# Patient Record
Sex: FEMALE | Race: BLACK OR AFRICAN AMERICAN | NOT HISPANIC OR LATINO | Employment: FULL TIME | URBAN - METROPOLITAN AREA
[De-identification: names, ages, dates, MRNs, and addresses within clinical notes are randomized per-mention and may not be internally consistent; named-entity substitution may affect disease eponyms.]

---

## 2023-04-03 LAB
ALANINE AMINOTRANSFERASE (SGPT) (U/L) IN SER/PLAS: 9 U/L (ref 7–45)
ALBUMIN (G/DL) IN SER/PLAS: 3.9 G/DL (ref 3.4–5)
ALKALINE PHOSPHATASE (U/L) IN SER/PLAS: 34 U/L (ref 33–110)
ANION GAP IN SER/PLAS: 12 MMOL/L (ref 10–20)
ASPARTATE AMINOTRANSFERASE (SGOT) (U/L) IN SER/PLAS: 13 U/L (ref 9–39)
BASOPHILS (10*3/UL) IN BLOOD BY AUTOMATED COUNT: 0.03 X10E9/L (ref 0–0.1)
BASOPHILS/100 LEUKOCYTES IN BLOOD BY AUTOMATED COUNT: 0.7 % (ref 0–2)
BILIRUBIN TOTAL (MG/DL) IN SER/PLAS: 0.7 MG/DL (ref 0–1.2)
CALCIUM (MG/DL) IN SER/PLAS: 9.1 MG/DL (ref 8.6–10.6)
CARBON DIOXIDE, TOTAL (MMOL/L) IN SER/PLAS: 25 MMOL/L (ref 21–32)
CHLORIDE (MMOL/L) IN SER/PLAS: 107 MMOL/L (ref 98–107)
CREATININE (MG/DL) IN SER/PLAS: 0.81 MG/DL (ref 0.5–1.05)
EOSINOPHILS (10*3/UL) IN BLOOD BY AUTOMATED COUNT: 0.03 X10E9/L (ref 0–0.7)
EOSINOPHILS/100 LEUKOCYTES IN BLOOD BY AUTOMATED COUNT: 0.7 % (ref 0–6)
ERYTHROCYTE DISTRIBUTION WIDTH (RATIO) BY AUTOMATED COUNT: 13.3 % (ref 11.5–14.5)
ERYTHROCYTE MEAN CORPUSCULAR HEMOGLOBIN CONCENTRATION (G/DL) BY AUTOMATED: 33.7 G/DL (ref 32–36)
ERYTHROCYTE MEAN CORPUSCULAR VOLUME (FL) BY AUTOMATED COUNT: 96 FL (ref 80–100)
ERYTHROCYTES (10*6/UL) IN BLOOD BY AUTOMATED COUNT: 4.01 X10E12/L (ref 4–5.2)
FERRITIN (UG/LL) IN SER/PLAS: 152 UG/L (ref 8–150)
GFR FEMALE: >90 ML/MIN/1.73M2
GLUCOSE (MG/DL) IN SER/PLAS: 97 MG/DL (ref 74–99)
HEMATOCRIT (%) IN BLOOD BY AUTOMATED COUNT: 38.3 % (ref 36–46)
HEMOGLOBIN (G/DL) IN BLOOD: 12.9 G/DL (ref 12–16)
IMMATURE GRANULOCYTES/100 LEUKOCYTES IN BLOOD BY AUTOMATED COUNT: 0.5 % (ref 0–0.9)
LEUKOCYTES (10*3/UL) IN BLOOD BY AUTOMATED COUNT: 4.4 X10E9/L (ref 4.4–11.3)
LYMPHOCYTES (10*3/UL) IN BLOOD BY AUTOMATED COUNT: 1.87 X10E9/L (ref 1.2–4.8)
LYMPHOCYTES/100 LEUKOCYTES IN BLOOD BY AUTOMATED COUNT: 42.3 % (ref 13–44)
MONOCYTES (10*3/UL) IN BLOOD BY AUTOMATED COUNT: 0.39 X10E9/L (ref 0.1–1)
MONOCYTES/100 LEUKOCYTES IN BLOOD BY AUTOMATED COUNT: 8.8 % (ref 2–10)
NEUTROPHILS (10*3/UL) IN BLOOD BY AUTOMATED COUNT: 2.08 X10E9/L (ref 1.2–7.7)
NEUTROPHILS/100 LEUKOCYTES IN BLOOD BY AUTOMATED COUNT: 47 % (ref 40–80)
NRBC (PER 100 WBCS) BY AUTOMATED COUNT: 0 /100 WBC (ref 0–0)
PLATELETS (10*3/UL) IN BLOOD AUTOMATED COUNT: 229 X10E9/L (ref 150–450)
POTASSIUM (MMOL/L) IN SER/PLAS: 4.5 MMOL/L (ref 3.5–5.3)
PROTEIN TOTAL: 6.7 G/DL (ref 6.4–8.2)
SODIUM (MMOL/L) IN SER/PLAS: 139 MMOL/L (ref 136–145)
THYROTROPIN (MIU/L) IN SER/PLAS BY DETECTION LIMIT <= 0.05 MIU/L: 0.78 MIU/L (ref 0.44–3.98)
THYROXINE (T4) FREE (NG/DL) IN SER/PLAS: 1.08 NG/DL (ref 0.78–1.48)
UREA NITROGEN (MG/DL) IN SER/PLAS: 11 MG/DL (ref 6–23)

## 2023-04-06 LAB — ZINC,SERUM OR PLASMA: 60.4 UG/DL (ref 60–120)

## 2023-04-10 LAB
DEHYDROEPIANDROSTERONE (DHEA) (NG/ML) IN SER/PLAS: 2.4 NG/ML (ref 0.63–4.7)
TESTOSTERONE FREE (CHAN): 3.1 PG/ML (ref 0.1–6.4)
TESTOSTERONE,TOTAL,LC-MS/MS: 13 NG/DL (ref 2–45)

## 2023-06-01 LAB
ALANINE AMINOTRANSFERASE (SGPT) (U/L) IN SER/PLAS: 8 U/L (ref 7–45)
ALBUMIN (G/DL) IN SER/PLAS: 4.2 G/DL (ref 3.4–5)
ALKALINE PHOSPHATASE (U/L) IN SER/PLAS: 28 U/L (ref 33–110)
ANION GAP IN SER/PLAS: 11 MMOL/L (ref 10–20)
ASPARTATE AMINOTRANSFERASE (SGOT) (U/L) IN SER/PLAS: 11 U/L (ref 9–39)
BASOPHILS (10*3/UL) IN BLOOD BY AUTOMATED COUNT: 0.04 X10E9/L (ref 0–0.1)
BASOPHILS/100 LEUKOCYTES IN BLOOD BY AUTOMATED COUNT: 1.1 % (ref 0–2)
BILIRUBIN TOTAL (MG/DL) IN SER/PLAS: 1.2 MG/DL (ref 0–1.2)
C REACTIVE PROTEIN (MG/L) IN SER/PLAS BY HIGH SENSIT: 0.3 MG/L
CALCIDIOL (25 OH VITAMIN D3) (NG/ML) IN SER/PLAS: 37 NG/ML
CALCIUM (MG/DL) IN SER/PLAS: 9.6 MG/DL (ref 8.6–10.3)
CARBON DIOXIDE, TOTAL (MMOL/L) IN SER/PLAS: 24 MMOL/L (ref 21–32)
CHLORIDE (MMOL/L) IN SER/PLAS: 102 MMOL/L (ref 98–107)
CHOLESTEROL (MG/DL) IN SER/PLAS: 148 MG/DL (ref 0–199)
CHOLESTEROL IN HDL (MG/DL) IN SER/PLAS: 55.5 MG/DL
CHOLESTEROL/HDL RATIO: 2.7
COBALAMIN (VITAMIN B12) (PG/ML) IN SER/PLAS: 854 PG/ML (ref 211–911)
CREATININE (MG/DL) IN SER/PLAS: 0.79 MG/DL (ref 0.5–1.05)
EOSINOPHILS (10*3/UL) IN BLOOD BY AUTOMATED COUNT: 0.04 X10E9/L (ref 0–0.7)
EOSINOPHILS/100 LEUKOCYTES IN BLOOD BY AUTOMATED COUNT: 1.1 % (ref 0–6)
ERYTHROCYTE DISTRIBUTION WIDTH (RATIO) BY AUTOMATED COUNT: 13.5 % (ref 11.5–14.5)
ERYTHROCYTE MEAN CORPUSCULAR HEMOGLOBIN CONCENTRATION (G/DL) BY AUTOMATED: 33 G/DL (ref 32–36)
ERYTHROCYTE MEAN CORPUSCULAR VOLUME (FL) BY AUTOMATED COUNT: 95 FL (ref 80–100)
ERYTHROCYTES (10*6/UL) IN BLOOD BY AUTOMATED COUNT: 4.54 X10E12/L (ref 4–5.2)
ESTIMATED AVERAGE GLUCOSE FOR HBA1C: 117 MG/DL
ESTRADIOL (PG/ML) IN SER/PLAS: 423 PG/ML
FERRITIN (UG/LL) IN SER/PLAS: 156 UG/L (ref 8–150)
FOLLITROPIN (IU/L) IN SER/PLAS: 5.5 IU/L
GFR FEMALE: >90 ML/MIN/1.73M2
GLUCOSE (MG/DL) IN SER/PLAS: 82 MG/DL (ref 74–99)
HEMATOCRIT (%) IN BLOOD BY AUTOMATED COUNT: 43 % (ref 36–46)
HEMOGLOBIN (G/DL) IN BLOOD: 14.2 G/DL (ref 12–16)
HEMOGLOBIN A1C/HEMOGLOBIN TOTAL IN BLOOD: 5.7 %
IMMATURE GRANULOCYTES/100 LEUKOCYTES IN BLOOD BY AUTOMATED COUNT: 0 % (ref 0–0.9)
INSULIN, FASTING: 8 UIU/ML (ref 3–25)
IRON (UG/DL) IN SER/PLAS: 149 UG/DL (ref 35–150)
IRON BINDING CAPACITY (UG/DL) IN SER/PLAS: 336 UG/DL (ref 240–445)
IRON SATURATION (%) IN SER/PLAS: 44 % (ref 25–45)
LDL: 80 MG/DL (ref 0–99)
LEUKOCYTES (10*3/UL) IN BLOOD BY AUTOMATED COUNT: 3.6 X10E9/L (ref 4.4–11.3)
LYMPHOCYTES (10*3/UL) IN BLOOD BY AUTOMATED COUNT: 1.66 X10E9/L (ref 1.2–4.8)
LYMPHOCYTES/100 LEUKOCYTES IN BLOOD BY AUTOMATED COUNT: 46.1 % (ref 13–44)
MAGNESIUM (MG/DL) IN SER/PLAS: 2 MG/DL (ref 1.6–2.4)
MONOCYTES (10*3/UL) IN BLOOD BY AUTOMATED COUNT: 0.29 X10E9/L (ref 0.1–1)
MONOCYTES/100 LEUKOCYTES IN BLOOD BY AUTOMATED COUNT: 8.1 % (ref 2–10)
NEUTROPHILS (10*3/UL) IN BLOOD BY AUTOMATED COUNT: 1.57 X10E9/L (ref 1.2–7.7)
NEUTROPHILS/100 LEUKOCYTES IN BLOOD BY AUTOMATED COUNT: 43.6 % (ref 40–80)
PLATELETS (10*3/UL) IN BLOOD AUTOMATED COUNT: 261 X10E9/L (ref 150–450)
POTASSIUM (MMOL/L) IN SER/PLAS: 4.2 MMOL/L (ref 3.5–5.3)
PROTEIN TOTAL: 7.3 G/DL (ref 6.4–8.2)
SODIUM (MMOL/L) IN SER/PLAS: 133 MMOL/L (ref 136–145)
THYROTROPIN (MIU/L) IN SER/PLAS BY DETECTION LIMIT <= 0.05 MIU/L: 0.9 MIU/L (ref 0.44–3.98)
TRIGLYCERIDE (MG/DL) IN SER/PLAS: 63 MG/DL (ref 0–149)
URATE (MG/DL) IN SER/PLAS: 4.1 MG/DL (ref 2.3–6.7)
UREA NITROGEN (MG/DL) IN SER/PLAS: 9 MG/DL (ref 6–23)
VLDL: 13 MG/DL (ref 0–40)

## 2023-06-05 LAB — LIPOPROTEIN A: 76 MG/DL

## 2023-08-23 ENCOUNTER — LAB (OUTPATIENT)
Dept: LAB | Facility: LAB | Age: 43
End: 2023-08-23
Payer: COMMERCIAL

## 2023-08-23 LAB
ESTRADIOL (PG/ML) IN SER/PLAS: 57 PG/ML
FOLLITROPIN (IU/L) IN SER/PLAS: 4.3 IU/L

## 2023-08-30 PROBLEM — R92.2 DENSE BREAST: Status: ACTIVE | Noted: 2023-08-30

## 2023-08-30 PROBLEM — H92.09 UNILATERAL OTALGIA: Status: ACTIVE | Noted: 2023-08-30

## 2023-08-30 PROBLEM — S73.199A LABRAL TEAR OF HIP JOINT: Status: ACTIVE | Noted: 2023-08-30

## 2023-08-30 PROBLEM — R92.30 DENSE BREAST: Status: ACTIVE | Noted: 2023-08-30

## 2023-08-30 PROBLEM — L65.9 HAIR LOSS: Status: ACTIVE | Noted: 2023-08-30

## 2023-08-30 PROBLEM — R10.13 DYSPEPSIA: Status: ACTIVE | Noted: 2023-08-30

## 2023-08-30 PROBLEM — N94.9 DISORDER OF FEMALE GENITAL ORGANS: Status: ACTIVE | Noted: 2023-08-30

## 2023-08-30 PROBLEM — L70.9 ACNE: Status: ACTIVE | Noted: 2023-08-30

## 2023-08-30 PROBLEM — N91.2 AMENORRHEA: Status: ACTIVE | Noted: 2023-08-30

## 2023-08-30 PROBLEM — M25.852 FEMOROACETABULAR IMPINGEMENT OF LEFT HIP: Status: ACTIVE | Noted: 2023-08-30

## 2023-08-30 PROBLEM — R79.89 ELEVATED FERRITIN: Status: ACTIVE | Noted: 2023-08-30

## 2023-08-30 RX ORDER — PANTOPRAZOLE SODIUM 20 MG/1
1 TABLET, DELAYED RELEASE ORAL DAILY
COMMUNITY
Start: 2022-03-15 | End: 2024-03-25 | Stop reason: WASHOUT

## 2023-08-30 RX ORDER — SPIRONOLACTONE 50 MG/1
1 TABLET, FILM COATED ORAL DAILY
COMMUNITY
End: 2024-03-25 | Stop reason: WASHOUT

## 2023-08-30 RX ORDER — MINOXIDIL 2.5 MG/1
1 TABLET ORAL DAILY
COMMUNITY
Start: 2023-07-08

## 2023-08-30 RX ORDER — SPIRONOLACTONE 100 MG/1
1 TABLET, FILM COATED ORAL DAILY
COMMUNITY

## 2023-08-30 RX ORDER — NORETHINDRONE AND ETHINYL ESTRADIOL 7 DAYS X 3
1 KIT ORAL DAILY
COMMUNITY
Start: 2007-09-04 | End: 2024-03-25 | Stop reason: WASHOUT

## 2023-08-30 RX ORDER — TAZAROTENE 0.45 MG/G
LOTION TOPICAL
COMMUNITY
Start: 2023-03-06

## 2023-08-30 RX ORDER — ADAPALENE GEL USP, 0.3% 3 MG/G
GEL TOPICAL NIGHTLY
COMMUNITY
Start: 2019-10-24 | End: 2024-03-25 | Stop reason: WASHOUT

## 2023-08-30 RX ORDER — DAPSONE 50 MG/G
GEL TOPICAL EVERY MORNING
COMMUNITY
Start: 2019-10-24 | End: 2024-03-25 | Stop reason: WASHOUT

## 2023-10-02 ENCOUNTER — TELEPHONE (OUTPATIENT)
Dept: HEMATOLOGY/ONCOLOGY | Facility: CLINIC | Age: 43
End: 2023-10-02
Payer: COMMERCIAL

## 2023-10-02 NOTE — TELEPHONE ENCOUNTER
Call placed to patient.  Notified of providers response. Labs reviewed by phone.  Patient appreciative of call.  Call back instructions reviewed.  Patient verbalized understanding.

## 2023-10-06 ENCOUNTER — APPOINTMENT (OUTPATIENT)
Dept: PRIMARY CARE | Facility: CLINIC | Age: 43
End: 2023-10-06
Payer: COMMERCIAL

## 2023-10-24 ENCOUNTER — APPOINTMENT (OUTPATIENT)
Dept: PRIMARY CARE | Facility: CLINIC | Age: 43
End: 2023-10-24
Payer: COMMERCIAL

## 2023-12-12 ENCOUNTER — APPOINTMENT (OUTPATIENT)
Dept: HEMATOLOGY/ONCOLOGY | Facility: CLINIC | Age: 43
End: 2023-12-12
Payer: COMMERCIAL

## 2023-12-12 ENCOUNTER — TELEPHONE (OUTPATIENT)
Dept: HEMATOLOGY/ONCOLOGY | Facility: CLINIC | Age: 43
End: 2023-12-12
Payer: COMMERCIAL

## 2024-03-04 ENCOUNTER — OFFICE VISIT (OUTPATIENT)
Dept: PRIMARY CARE | Facility: CLINIC | Age: 44
End: 2024-03-04
Payer: COMMERCIAL

## 2024-03-04 VITALS
BODY MASS INDEX: 27.77 KG/M2 | TEMPERATURE: 97.5 F | OXYGEN SATURATION: 97 % | HEIGHT: 66 IN | SYSTOLIC BLOOD PRESSURE: 109 MMHG | HEART RATE: 74 BPM | DIASTOLIC BLOOD PRESSURE: 72 MMHG | WEIGHT: 172.8 LBS

## 2024-03-04 DIAGNOSIS — R60.0 LEG EDEMA, RIGHT: Primary | ICD-10-CM

## 2024-03-04 DIAGNOSIS — R06.02 SHORTNESS OF BREATH: ICD-10-CM

## 2024-03-04 PROCEDURE — 99212 OFFICE O/P EST SF 10 MIN: CPT | Performed by: INTERNAL MEDICINE

## 2024-03-04 PROCEDURE — 1036F TOBACCO NON-USER: CPT | Performed by: INTERNAL MEDICINE

## 2024-03-04 RX ORDER — CEFDINIR 300 MG/1
300 CAPSULE ORAL
COMMUNITY
Start: 2024-02-25 | End: 2024-03-25 | Stop reason: WASHOUT

## 2024-03-04 ASSESSMENT — PATIENT HEALTH QUESTIONNAIRE - PHQ9
SUM OF ALL RESPONSES TO PHQ9 QUESTIONS 1 AND 2: 0
1. LITTLE INTEREST OR PLEASURE IN DOING THINGS: NOT AT ALL
2. FEELING DOWN, DEPRESSED OR HOPELESS: NOT AT ALL

## 2024-03-04 NOTE — PROGRESS NOTES
"Subjective   Patient ID: Toya De La Fuente is a 43 y.o. female who presents for Pain (Right leg pain/swelling has been going on since flight also having headaches, and some chest discomfort (states not pain). Little bit of lightheadedness. All of this started after first flight on Wednesday, and became worse after 2nd flight home. ).    HPI   Started after flight last wed.painful and swollen.  Was initially better and got worse again.  Pain is in the lower leg.  Has slight discomfort in calf.  Also feels lightheaded and has a headache.  Has slight chest pressure in the middle and slightly out of breath with exertion  Travel was to [place] this time and took 4 hours.  2 weeks prior to that was in [place]  Does not have any personal history of blood clot.  Mother has history of blood clot  She gets regular phlebotomy for hemochromatosis  Review of Systems  No fever or chills or weight loss  No cough sinus symptoms  No nausea vomiting or heartburn or bowel changes  No urinary symptoms  No anxiety or depression  Objective   /72   Pulse 74   Temp 36.4 °C (97.5 °F)   Ht 1.676 m (5' 6\")   Wt 78.4 kg (172 lb 12.8 oz)   SpO2 97%   BMI 27.89 kg/m²   Walking in the hallway made her short of breath with heart rate of 107  Physical Exam  In general not in acute distress  No pallor or icterus  Neck supple no JVD or lymphadenopathy  Chest clear to auscultation  CVS: S1-S2 regular not tachycardic no murmur  Abdomen soft nontender no mass felt  Examination of right lower extremity reveals slight calf tenderness and slight swelling of lower extremity.  Has tenderness about ankle  Left side is normal  Assessment/Plan   Problem List Items Addressed This Visit             ICD-10-CM    Leg edema, right - Primary R60.0    Shortness of breath R06.02        Patient has leg edema and tenderness with symptoms of chest pressure and mild shortness of breath with exertion and lightheadedness.  She did travel to [place] and also [place] " recently.  Has hemochromatosis and family history of DVT  Her symptoms are somewhat concerning for DVT with PE.  Need urgent testing.  Will send her to ER.she is stable and will drive

## 2024-03-16 ENCOUNTER — TELEMEDICINE (OUTPATIENT)
Dept: PRIMARY CARE | Facility: CLINIC | Age: 44
End: 2024-03-16
Payer: COMMERCIAL

## 2024-03-16 DIAGNOSIS — B35.1 ONYCHOMYCOSIS OF GREAT TOE: ICD-10-CM

## 2024-03-16 DIAGNOSIS — R21 RASH AND NONSPECIFIC SKIN ERUPTION: Primary | ICD-10-CM

## 2024-03-16 PROCEDURE — 1036F TOBACCO NON-USER: CPT | Performed by: FAMILY MEDICINE

## 2024-03-16 PROCEDURE — 99213 OFFICE O/P EST LOW 20 MIN: CPT | Performed by: FAMILY MEDICINE

## 2024-03-16 ASSESSMENT — ENCOUNTER SYMPTOMS
FEVER: 0
SHORTNESS OF BREATH: 0
FATIGUE: 0

## 2024-03-17 NOTE — PROGRESS NOTES
Subjective   Patient ID: Toya De La Fuente is a 43 y.o. female who presents for No chief complaint on file..  HPI  Toya presents for concerns of issues with skin of right lower leg and toenail of right foot.  She sent pictures of right foot and toe.  She recently traveled via airplane, so when she complained of leg swelling last week, was sent to ER, worked up for possible clot, testing negative.  She is just concerned that there is still some pain to the leg, but no itch.    The right toenail fungus has been present for several months.  Review of Systems   Constitutional:  Negative for fatigue and fever.   Respiratory:  Negative for shortness of breath.    Cardiovascular:  Negative for chest pain.       Objective   There were no vitals filed for this visit.   Physical Exam  Constitutional:       General: She is not in acute distress.     Appearance: Normal appearance.   HENT:      Head: Normocephalic and atraumatic.      Right Ear: External ear normal.      Left Ear: External ear normal.      Nose: Nose normal.   Eyes:      Conjunctiva/sclera: Conjunctivae normal.   Pulmonary:      Effort: Pulmonary effort is normal. No respiratory distress.   Neurological:      Mental Status: She is alert and oriented to person, place, and time.   Psychiatric:         Mood and Affect: Mood normal.         Behavior: Behavior normal.         Thought Content: Thought content normal.         Assessment/Plan   There are no diagnoses linked to this encounter.    Problem List Items Addressed This Visit    None  Visit Diagnoses         Codes    Rash and nonspecific skin eruption    -  Primary R21    No concern of cellulitis.  Possibly erythema nodosum, but nothing emergent.  Reassurance given, can monitor, if symptoms worsen to see PCP     Onychomycosis of great toe     B35.1    Advised to follow up with PCP for treatment

## 2024-03-25 ENCOUNTER — OFFICE VISIT (OUTPATIENT)
Dept: PRIMARY CARE | Facility: CLINIC | Age: 44
End: 2024-03-25
Payer: COMMERCIAL

## 2024-03-25 VITALS
DIASTOLIC BLOOD PRESSURE: 80 MMHG | TEMPERATURE: 97 F | BODY MASS INDEX: 27.69 KG/M2 | SYSTOLIC BLOOD PRESSURE: 115 MMHG | WEIGHT: 172.3 LBS | HEART RATE: 99 BPM | OXYGEN SATURATION: 95 % | HEIGHT: 66 IN

## 2024-03-25 DIAGNOSIS — J40 BRONCHITIS: Primary | ICD-10-CM

## 2024-03-25 DIAGNOSIS — B35.1 ONYCHOMYCOSIS OF GREAT TOE: ICD-10-CM

## 2024-03-25 PROCEDURE — 99214 OFFICE O/P EST MOD 30 MIN: CPT | Performed by: INTERNAL MEDICINE

## 2024-03-25 PROCEDURE — 1036F TOBACCO NON-USER: CPT | Performed by: INTERNAL MEDICINE

## 2024-03-25 RX ORDER — ALBUTEROL SULFATE 90 UG/1
2 AEROSOL, METERED RESPIRATORY (INHALATION) EVERY 4 HOURS PRN
Qty: 8.5 G | Refills: 0 | Status: SHIPPED | OUTPATIENT
Start: 2024-03-25 | End: 2024-05-02 | Stop reason: SDUPTHER

## 2024-03-25 RX ORDER — AZITHROMYCIN 250 MG/1
TABLET, FILM COATED ORAL
Qty: 6 TABLET | Refills: 0 | Status: SHIPPED | OUTPATIENT
Start: 2024-03-25 | End: 2024-03-30

## 2024-03-25 RX ORDER — TERBINAFINE HYDROCHLORIDE 250 MG/1
250 TABLET ORAL DAILY
Qty: 90 TABLET | Refills: 0 | Status: SHIPPED | OUTPATIENT
Start: 2024-03-25 | End: 2024-06-23

## 2024-03-25 ASSESSMENT — PATIENT HEALTH QUESTIONNAIRE - PHQ9
1. LITTLE INTEREST OR PLEASURE IN DOING THINGS: NOT AT ALL
SUM OF ALL RESPONSES TO PHQ9 QUESTIONS 1 AND 2: 0
2. FEELING DOWN, DEPRESSED OR HOPELESS: NOT AT ALL

## 2024-03-25 NOTE — PROGRESS NOTES
"Subjective   Patient ID: Toya De La Fuente is a 43 y.o. female who presents for Cough (Sore throat, congestion, coughing up phlegm, and headache. Fungus on both big toes has been going on for a couple of months. ).    HPI   Patient is presenting with cold symptoms that started 7  days ago.  Associated symptoms include headache, fatigue, postnasal drip, sinus pain, sore throat, cough.  No chest pain .came back from [place] Saturday. No exposure to strep or pneumonia or mono.mucous blood stained  No associated symptoms of nausea, vomiting, diarrhea.  No skin rash   Tried over-the-counter medications without improvement.   She was in the ER after last visit to rule out DVT and PE.  Labs and ultrasound and CT chest was done  No blood clot  Edema much better  Has toenail fungus both big toes and would like medication    Review of Systems  No fever or chills  Feels tired  No nausea vomiting heartburn or bowel changes  No anxiety or depression  Objective   /80   Pulse 99   Temp 36.1 °C (97 °F)   Ht 1.676 m (5' 6\")   Wt 78.2 kg (172 lb 4.8 oz)   SpO2 95%   BMI 27.81 kg/m²     Physical Exam  In NAD  No pallor or icterus  No sinus tenderness normal tympanic membrane  Oral mucosa dry.  Mild throat erythema  Neck without lymphadenopathy or JVD  Chest is clear  CVS: S1-S2 regular not tachycardic no murmur no edema  Examination of bilateral toenails shows onychomycosis bilateral big toes.  Has slight hyperpigmented macular patches above ankle on right side  Assessment/Plan   Problem List Items Addressed This Visit             ICD-10-CM    Bronchitis - Primary J40    Relevant Medications    azithromycin (Zithromax) 250 mg tablet    albuterol (ProAir HFA) 90 mcg/actuation inhaler    Onychomycosis of great toe B35.1    Relevant Medications    terbinafine (LamISIL) 250 mg tablet     Likely bronchitis.  She has some shortness of breath.  Will prescribe inhaler and Zithromax.  Has been traveling a lot.  Walking pneumonia " possible.  Will get an x-ray if symptoms persist  Will prescribe Lamisil for onychomycosis.  Will check her labs at follow-up  Start Lamisil after finishing antibiotics

## 2024-04-26 ENCOUNTER — OFFICE VISIT (OUTPATIENT)
Dept: PODIATRY | Facility: CLINIC | Age: 44
End: 2024-04-26
Payer: COMMERCIAL

## 2024-04-26 DIAGNOSIS — M79.674 TOE PAIN, RIGHT: Primary | ICD-10-CM

## 2024-04-26 DIAGNOSIS — M79.675 TOE PAIN, LEFT: ICD-10-CM

## 2024-04-26 DIAGNOSIS — B35.1 NAIL FUNGAL INFECTION: ICD-10-CM

## 2024-04-26 PROCEDURE — 99202 OFFICE O/P NEW SF 15 MIN: CPT | Performed by: PODIATRIST

## 2024-04-29 NOTE — PROGRESS NOTES
CC:  painful thickened and elongated toenails    HPI:  Pt seen as new pt presents complaining painful thickened and elongated toenails that are difficult to manage.  Onset was gradual with worsening course until recently.  Aggravated by shoe gear and ambulation.   She will be starting oral lamisil as well.    PCP: TABITHA Marroquin CNP  Last visit: 3-25-24     PMH  Past Medical History:   Diagnosis Date    Abdominal distension (gaseous) 06/21/2022    Bloating    Abdominal distension (gaseous) 06/21/2022    Bloating    Candidiasis, unspecified 02/24/2014    Yeast infection    Encounter for general adult medical examination without abnormal findings 04/15/2013    Normal routine physical examination    Encounter for gynecological examination (general) (routine) without abnormal findings 06/16/2022    Well woman exam with routine gynecological exam    Encounter for insertion of intrauterine contraceptive device 04/14/2020    Encounter for IUD insertion    Encounter for removal of intrauterine contraceptive device 04/14/2020    Encounter for IUD removal    Encounter for screening for malignant neoplasm of cervix 02/23/2021    Cervical cancer screening    Other conditions influencing health status     Nulliparity    Other muscle spasm 11/11/2014    Trapezius muscle spasm    Other specified cough 12/17/2018    Upper airway cough syndrome    Other specified disorders of eustachian tube, bilateral 10/07/2015    Dysfunction of both eustachian tubes    Other specified health status 06/27/2016    Birth control    Personal history of diseases of the skin and subcutaneous tissue 04/15/2013    History of sebaceous cyst    Personal history of diseases of the skin and subcutaneous tissue     History of acne    Personal history of diseases of the skin and subcutaneous tissue     History of pityriasis rosea    Personal history of other diseases of the musculoskeletal system and connective tissue 11/11/2015    History of neck pain     Personal history of other diseases of the nervous system and sense organs 11/11/2014    History of tension headache    Personal history of other diseases of the nervous system and sense organs 12/21/2013    History of ear pain    Personal history of other diseases of the respiratory system 11/06/2016    History of bronchitis    Personal history of other medical treatment 02/23/2021    History of screening mammography    Personal history of other specified conditions 02/13/2021    History of dysuria    Personal history of other specified conditions 03/10/2021    History of epigastric pain     MEDS    Current Outpatient Medications:     albuterol (ProAir HFA) 90 mcg/actuation inhaler, Inhale 2 puffs every 4 hours if needed for wheezing or shortness of breath., Disp: 8.5 g, Rfl: 0    Arazlo 0.045 % lotion, , Disp: , Rfl:     minoxidil (Loniten) 2.5 mg tablet, Take 1 tablet (2.5 mg) by mouth once daily., Disp: , Rfl:     spironolactone (Aldactone) 100 mg tablet, Take 1 tablet (100 mg) by mouth once daily., Disp: , Rfl:     terbinafine (LamISIL) 250 mg tablet, Take 1 tablet (250 mg) by mouth once daily., Disp: 90 tablet, Rfl: 0  Allergies  No Known Allergies  Social History     Socioeconomic History    Marital status:      Spouse name: Not on file    Number of children: Not on file    Years of education: Not on file    Highest education level: Not on file   Occupational History    Not on file   Tobacco Use    Smoking status: Never    Smokeless tobacco: Never   Substance and Sexual Activity    Alcohol use: Yes    Drug use: Not on file    Sexual activity: Not on file   Other Topics Concern    Not on file   Social History Narrative    Not on file     Social Determinants of Health     Financial Resource Strain: Not on file   Food Insecurity: Not on file   Transportation Needs: Not on file   Physical Activity: Not on file   Stress: Not on file   Social Connections: Not on file   Intimate Partner Violence: Not on  file   Housing Stability: Not on file     Family History   Problem Relation Name Age of Onset    Breast cancer Mother      Heart attack Father      Diabetes type II Father      Other (ESRD) Father          Dialysis    Other (Stroke Syndrome) Father      Breast cancer Sister      Pancreatitis Brother          Acute    Other (Stroke Syndrome) Brother      Colon cancer Maternal Grandmother      Ovarian cancer Maternal Grandmother      Pancreatic cancer Maternal Grandmother      Breast cancer Other Maternal Aunt     Breast cancer Other Paternal Aunt      Past Surgical History:   Procedure Laterality Date    COLONOSCOPY  06/01/2021    Complete Colonoscopy    OTHER SURGICAL HISTORY  04/30/2020    Abdominal liposuction    SEPTOPLASTY  11/13/2017    Septoplasty       REVIEW OF SYSTEMS    DERM:   + as noted in HPI.       Physical examination:   On General Observation: Patient is a pleasant, cooperative, well developed 43 y.o.  adult female. The patient is alert and oriented to time, place and person. Patient has normal affect and mood.  There were no vitals taken for this visit.    Vascular:  DP and PT pulses are 2/4 b/l.  no edema noted. no varicosities b/l.  CFT  5 seconds to all digits bilateral.  Skin temperature is warm to warm from proximal to distal bilateral.      Muscular: Strength is 5/5 for all instrinsic and extrinsic muscle groups.     Neuro:  Proprioception present.   Sensation to vibration is  present. Protective sensation intact  at all pedal sites via [place] [employer] 5.07 monofilament bilateral.  Light touch present bilateral.     Derm:    Decreased hair growth b/l le  Left toenails: 1 Brittleness, crumbling upon debridement, subungual debris, elongation, mycotic appearance, tenderness, and thickness.   Right toenails: 1 Brittleness, crumbling upon debridement, subungual debris, elongation, mycotic appearance, tenderness, and thickness.       ASSESSMENT:    Tinea Unguium [B35.1]   Pain in right toe(s)  [M11.854]   Pain in left toe(s) [M79.838]       PLAN:   Consult  A comprehensive history and physical examination were preformed. The patient was educated on clinical findings, diagnosis and treatment plans. Patient understands all that has been explained and all questions were answered to apparent satisfaction.   -Reviewed etiology of nail fungus and treatment options, she will start oral lamisil, labs done by pcp, reviewed topical therapy, consent reviewed and signed for [place]pointe foot laser, reviewed risks, benefits, complications, hallux nails debrided, laser done times 2 toes no complications, will start topical formula 3, follow up 6 months, she will need repeat lft's 6 weeks into the oral lamisil.    Joel Gutierrez DPM

## 2024-05-02 ENCOUNTER — OFFICE VISIT (OUTPATIENT)
Dept: PRIMARY CARE | Facility: CLINIC | Age: 44
End: 2024-05-02
Payer: COMMERCIAL

## 2024-05-02 VITALS
BODY MASS INDEX: 27.64 KG/M2 | HEIGHT: 66 IN | HEART RATE: 90 BPM | WEIGHT: 172 LBS | TEMPERATURE: 97.8 F | SYSTOLIC BLOOD PRESSURE: 115 MMHG | OXYGEN SATURATION: 98 % | DIASTOLIC BLOOD PRESSURE: 70 MMHG

## 2024-05-02 DIAGNOSIS — J40 BRONCHITIS: ICD-10-CM

## 2024-05-02 DIAGNOSIS — R06.02 SHORTNESS OF BREATH: ICD-10-CM

## 2024-05-02 DIAGNOSIS — Z00.00 ANNUAL PHYSICAL EXAM: Primary | ICD-10-CM

## 2024-05-02 DIAGNOSIS — Z12.11 COLON CANCER SCREENING: ICD-10-CM

## 2024-05-02 PROCEDURE — 99396 PREV VISIT EST AGE 40-64: CPT | Performed by: INTERNAL MEDICINE

## 2024-05-02 RX ORDER — ALBUTEROL SULFATE 90 UG/1
2 AEROSOL, METERED RESPIRATORY (INHALATION) EVERY 4 HOURS PRN
Qty: 8.5 G | Refills: 1 | Status: SHIPPED | OUTPATIENT
Start: 2024-05-02 | End: 2025-05-02

## 2024-05-02 ASSESSMENT — COLUMBIA-SUICIDE SEVERITY RATING SCALE - C-SSRS
1. IN THE PAST MONTH, HAVE YOU WISHED YOU WERE DEAD OR WISHED YOU COULD GO TO SLEEP AND NOT WAKE UP?: NO
6. HAVE YOU EVER DONE ANYTHING, STARTED TO DO ANYTHING, OR PREPARED TO DO ANYTHING TO END YOUR LIFE?: NO
2. HAVE YOU ACTUALLY HAD ANY THOUGHTS OF KILLING YOURSELF?: NO

## 2024-05-02 NOTE — PROGRESS NOTES
Subjective   Patient ID: Toya De La Fuente is a 43 y.o. female who presents for Annual Exam (Pt is here for annual exam ).  HPI    Patient is here for annual exam  Does not have any new concerns today  sees ophthalmologist regularly.    Consumes healthy diet    does regular exercise  pregnancy historyg0  No bladder symptoms  Cervical cancer screen due this yr,nl 2021  No history of abnormal Pap  Mammogram due in summer  Colonoscopy due  Medical conditions:stable,inhaler helps with breathing.uses 3 times a day.  Vaccines:uptodate    Review of Systems  General: No significant change in weight, no fatigue, no fever, chills, or night sweats.  HEENT: No headache, dizziness, syncope. No blurred vision, double vision. No hearing loss, or ringing. No nasal discharge, sinus problems. No loose teeth, sore throat, hoarseness or neck stiffness.   Breast: No pain or discharge. No mass felt.   Respiratory: No cough while using inhaler,no mucous in throat, hemoptysis, wheezing, or shortness of breath. No snoring.  Cardiac: No chest pain, palpitations, orthopnea. No leg swelling or claudication pain.   Gastrointestinal: No indigestion, heartburn, nausea, vomiting, diarrhea, constipation, rectal bleeding or hemorrhoids.  Genitourinary: No UTI symptoms, stones, incontinence.  OBGYN: No abnormal bleeding, No pelvic pain or bloating.  Endocrine: No excess thirst or urination.  Hematopoietic: No anemia, easy bruising or bleeding. No history of blood transfusion.  Neuro: No localized weakness, numbness or tingling. No tremor, history of seizure. No history of memory problems.  Psychological: No anxiety, depression or sleep disturbance.    HISTORY  Social History     Socioeconomic History    Marital status:      Spouse name: Not on file    Number of children: Not on file    Years of education: Not on file    Highest education level: Not on file   Occupational History    Not on file   Tobacco Use    Smoking status: Never    Smokeless  tobacco: Never   Substance and Sexual Activity    Alcohol use: Yes    Drug use: Never    Sexual activity: Not on file   Other Topics Concern    Not on file   Social History Narrative    Not on file     Social Determinants of Health     Financial Resource Strain: Not on file   Food Insecurity: Not on file   Transportation Needs: Not on file   Physical Activity: Not on file   Stress: Not on file   Social Connections: Not on file   Intimate Partner Violence: Not on file   Housing Stability: Not on file     Family History   Problem Relation Name Age of Onset    Breast cancer Mother      Heart attack Father      Diabetes type II Father      Other (ESRD) Father          Dialysis    Other (Stroke Syndrome) Father      Breast cancer Sister      Pancreatitis Brother          Acute    Other (Stroke Syndrome) Brother      Colon cancer Maternal Grandmother      Ovarian cancer Maternal Grandmother      Pancreatic cancer Maternal Grandmother      Breast cancer Other Maternal Aunt     Breast cancer Other Paternal Aunt      Past Medical History:   Diagnosis Date    Abdominal distension (gaseous) 06/21/2022    Bloating    Abdominal distension (gaseous) 06/21/2022    Bloating    Candidiasis, unspecified 02/24/2014    Yeast infection    Encounter for general adult medical examination without abnormal findings 04/15/2013    Normal routine physical examination    Encounter for gynecological examination (general) (routine) without abnormal findings 06/16/2022    Well woman exam with routine gynecological exam    Encounter for insertion of intrauterine contraceptive device 04/14/2020    Encounter for IUD insertion    Encounter for removal of intrauterine contraceptive device 04/14/2020    Encounter for IUD removal    Encounter for screening for malignant neoplasm of cervix 02/23/2021    Cervical cancer screening    Other conditions influencing health status     Nulliparity    Other muscle spasm 11/11/2014    Trapezius muscle spasm     "Other specified cough 12/17/2018    Upper airway cough syndrome    Other specified disorders of eustachian tube, bilateral 10/07/2015    Dysfunction of both eustachian tubes    Other specified health status 06/27/2016    Birth control    Personal history of diseases of the skin and subcutaneous tissue 04/15/2013    History of sebaceous cyst    Personal history of diseases of the skin and subcutaneous tissue     History of acne    Personal history of diseases of the skin and subcutaneous tissue     History of pityriasis rosea    Personal history of other diseases of the musculoskeletal system and connective tissue 11/11/2015    History of neck pain    Personal history of other diseases of the nervous system and sense organs 11/11/2014    History of tension headache    Personal history of other diseases of the nervous system and sense organs 12/21/2013    History of ear pain    Personal history of other diseases of the respiratory system 11/06/2016    History of bronchitis    Personal history of other medical treatment 02/23/2021    History of screening mammography    Personal history of other specified conditions 02/13/2021    History of dysuria    Personal history of other specified conditions 03/10/2021    History of epigastric pain     Past Surgical History:   Procedure Laterality Date    COLONOSCOPY  06/01/2021    Complete Colonoscopy    OTHER SURGICAL HISTORY  04/30/2020    Abdominal liposuction    SEPTOPLASTY  11/13/2017    Septoplasty     @VACCINES  Objective   /70   Pulse 90   Temp 36.6 °C (97.8 °F)   Ht 1.676 m (5' 6\")   Wt 78 kg (172 lb)   SpO2 98%   BMI 27.76 kg/m²      Lab Results   Component Value Date    WBC 5.0 09/27/2023    HGB 13.7 09/27/2023    HCT 40.2 09/27/2023    MCV 95 09/27/2023     09/27/2023   PAP@MAMMOGRAM  Physical Exam  In general, well-appearing, not in acute distress, alert and oriented.  HEENT: No pallor or icterus  Neck: No lymphadenopathy, no stiffness.  Supple.  " .No JVD  Chest: Clear to auscultation, good air entry.  CVS: S1 and S2 regular.  No murmur, no gallop, S3 or S4.  No peripheral edema.  No carotid bruit.  Abdomen: Soft, no tenderness, no hepatosplenomegaly.  Extremities: No calf tenderness.  No peripheral edema.   Neuro: No focal deficits.  Psych: Mood and affect normal.  Good judgment and insight   Assessment/Plan   Problem List Items Addressed This Visit       Shortness of breath    Relevant Orders    Complete Pulmonary Function Test Pre/Post Bronchodialator (Spirometry Pre/Post/DLCO/Lung Volumes)    Bronchitis    Relevant Medications    albuterol (ProAir HFA) 90 mcg/actuation inhaler    Annual physical exam - Primary     Other Visit Diagnoses       Colon cancer screening        Relevant Orders    Colonoscopy Screening; Average Risk Patient        Preventive exam and counseling completed.  Had mammogram 6/23.  pap nl 2020 and 2021.has follow up with gyn.   up-to-date with vaccinations except Hep A second dose.Recommended to get it asap.   colonoscopy for screen  She also goes for executive physicals annually. Reviewed previous testing reports.     Acne improved, hair loss better.  Ferritin is in normal range now     Has strong family history of breast cancer. Genetic testing completed  Will order pft since she is needing inhaler 3 times daily     Onychomycosis  better with rx.

## 2024-07-17 ENCOUNTER — OFFICE VISIT (OUTPATIENT)
Dept: PRIMARY CARE | Facility: CLINIC | Age: 44
End: 2024-07-17
Payer: COMMERCIAL

## 2024-07-17 VITALS
WEIGHT: 171.2 LBS | OXYGEN SATURATION: 98 % | DIASTOLIC BLOOD PRESSURE: 69 MMHG | BODY MASS INDEX: 27.51 KG/M2 | SYSTOLIC BLOOD PRESSURE: 107 MMHG | HEART RATE: 69 BPM | HEIGHT: 66 IN

## 2024-07-17 DIAGNOSIS — R51.9 FACIAL PAIN: Primary | ICD-10-CM

## 2024-07-17 DIAGNOSIS — B00.1 COLD SORE: ICD-10-CM

## 2024-07-17 PROCEDURE — 99213 OFFICE O/P EST LOW 20 MIN: CPT | Performed by: INTERNAL MEDICINE

## 2024-07-17 PROCEDURE — 3008F BODY MASS INDEX DOCD: CPT | Performed by: INTERNAL MEDICINE

## 2024-07-17 RX ORDER — ACYCLOVIR 400 MG/1
400 TABLET ORAL
Qty: 25 TABLET | Refills: 0 | Status: SHIPPED | OUTPATIENT
Start: 2024-07-17 | End: 2024-07-22

## 2024-07-17 ASSESSMENT — PATIENT HEALTH QUESTIONNAIRE - PHQ9
2. FEELING DOWN, DEPRESSED OR HOPELESS: NOT AT ALL
1. LITTLE INTEREST OR PLEASURE IN DOING THINGS: NOT AT ALL
SUM OF ALL RESPONSES TO PHQ9 QUESTIONS 1 AND 2: 0

## 2024-07-17 NOTE — PROGRESS NOTES
"Subjective   Patient ID: Toya De La Fuente is a 43 y.o. female who presents for Pain (Pt has had left ear and jaw pain for about 3 weeks that has not gone away. Also has a middle headache. ).    HPI   Left ear and jaw pain for 3 weeks.woke up with pain.  Constant pain  No cold symptoms  No dental issues.now 2/10  Was 9/10.  No history of migraine  Took advil  Has a white patch lower lip.  Had a cold sore prior to a bowel symptoms  Has history of recurrent cold sore.  Gets 3 or 4 times every year  Other conditions are stable  Review of Systems  No fever or chills  No chest pain or shortness of breath or palpitation  No numbness tingling or weakness  Objective   /69   Pulse 69   Ht 1.676 m (5' 6\")   Wt 77.7 kg (171 lb 3.2 oz)   SpO2 98%   BMI 27.63 kg/m²     Physical Exam  In general not in acute distress  No pallor or icterus  No sinus tenderness  Slight tenderness left TMJ and small preauricular lymph nodes felt.  Small anterior cervical lymph nodes felt which are slightly tender on left side.  No goiter  Lower lip towards the left side has small irritated area with some exfoliation  Chest is clear  CVS: S1-S2 regular not tachycardic  Neuro no focal findings  Assessment/Plan   Problem List Items Addressed This Visit             ICD-10-CM    Facial pain - Primary R51.9    Cold sore B00.1    Relevant Medications    acyclovir (Zovirax) 400 mg tablet        Facial pain  Been due to cold sore and reactive lymphadenitis.  She is already back to her.  Apply warm compress  Do gentle massage to release the muscles  Acyclovir for cold sore  If lip lesion is persisting need to see dentist.  Follow-up as needed  "

## 2024-08-02 DIAGNOSIS — Z12.11 COLON CANCER SCREENING: ICD-10-CM

## 2024-08-04 RX ORDER — POLYETHYLENE GLYCOL 3350, SODIUM CHLORIDE, SODIUM BICARBONATE AND POTASSIUM CHLORIDE 420; 5.72; 11.2; 1.48 G/4L; G/4L; G/4L; G/4L
4000 POWDER, FOR SOLUTION ORAL ONCE
Qty: 4000 ML | Refills: 0 | Status: SHIPPED | OUTPATIENT
Start: 2024-08-04 | End: 2024-08-04

## 2024-08-09 PROBLEM — K59.00 CONSTIPATION: Status: ACTIVE | Noted: 2024-08-09

## 2024-08-09 PROBLEM — Z97.5 PRESENCE OF INTRAUTERINE CONTRACEPTIVE DEVICE: Status: ACTIVE | Noted: 2024-08-09

## 2024-08-09 PROBLEM — R30.0 DYSURIA: Status: ACTIVE | Noted: 2024-08-09

## 2024-08-12 DIAGNOSIS — Z00.00 HEALTH MAINTENANCE EXAMINATION: ICD-10-CM

## 2024-08-18 ENCOUNTER — ANESTHESIA EVENT (OUTPATIENT)
Dept: GASTROENTEROLOGY | Facility: EXTERNAL LOCATION | Age: 44
End: 2024-08-18

## 2024-08-30 ENCOUNTER — ANESTHESIA (OUTPATIENT)
Dept: GASTROENTEROLOGY | Facility: EXTERNAL LOCATION | Age: 44
End: 2024-08-30

## 2024-08-30 ENCOUNTER — APPOINTMENT (OUTPATIENT)
Dept: GASTROENTEROLOGY | Facility: EXTERNAL LOCATION | Age: 44
End: 2024-08-30
Payer: COMMERCIAL

## 2024-09-20 NOTE — PROGRESS NOTES
Executive Physical         Patient ID: Toya De La Fuente is a 43 y.o. female who presents for Executive Health examination    The following report is in reference to your examination which was held at [facility] on 09/27/24. First, let me state that it was a pleasure meeting with you and  we appreciate that you have chosen [facility] for your executive evaluation.    Past medical history significant for  Hemochromatosis diagnosed a few years ago she does have hematology oncology she is currently donating blood every 2 to 3 months will avoid phlebotomy    no known family history of hemochromatosis  Otherwise not hospitalized for anything  She has a very strong family history of breast cancer in her mother at age 53 and her sister at age 46 unfortunately her sister has had recurrence  She did complete genetic testing which showed a single variant of uncertain significance CDKN1B 18  She did complete MRI of her breast in July 2023  Other concerns were that of pancreatic cancer and ovarian cancer in maternal grandmother  Paternal grandmother had breast cancer  Acne for which she takes spironolactone which has worked well  Also uses minoxidil for hair  Has had issues with insomnia she travels very frequently  Has struggled with her weight over time  Has had difficulty getting in the appropriate amount of exercise with her frequent travel thinks she is doing better from the exercise standpoint but really has issues with her diet with travel  She did have some issues recently with chest discomfort and shortness of breath that she ended up seeing her primary care doctor  She has noted some pain in her legs on occasion more when sitting seems to travel up her legs only lasts a few minutes at a time  He has had some intermittent issues with cold sores she is to get a couple perhaps a year but has had several this year she does get a real warning that they are  coming on also has noted some canker sores inside of her mouth    At the time of your evaluation your biggest health concerns were  Weight gain  Risks of cancer         Past Medical History:   Diagnosis Date    Abdominal distension (gaseous) 06/21/2022    Bloating    Abdominal distension (gaseous) 06/21/2022    Bloating    Candidiasis, unspecified 02/24/2014    Yeast infection    Encounter for general adult medical examination without abnormal findings 04/15/2013    Normal routine physical examination    Encounter for gynecological examination (general) (routine) without abnormal findings 06/16/2022    Well woman exam with routine gynecological exam    Encounter for insertion of intrauterine contraceptive device 04/14/2020    Encounter for IUD insertion    Encounter for removal of intrauterine contraceptive device 04/14/2020    Encounter for IUD removal    Encounter for screening for malignant neoplasm of cervix 02/23/2021    Cervical cancer screening    Other conditions influencing health status     Nulliparity    Other muscle spasm 11/11/2014    Trapezius muscle spasm    Other specified cough 12/17/2018    Upper airway cough syndrome    Other specified disorders of eustachian tube, bilateral 10/07/2015    Dysfunction of both eustachian tubes    Other specified health status 06/27/2016    Birth control    Personal history of diseases of the skin and subcutaneous tissue 04/15/2013    History of sebaceous cyst    Personal history of diseases of the skin and subcutaneous tissue     History of acne    Personal history of diseases of the skin and subcutaneous tissue     History of pityriasis rosea    Personal history of other diseases of the musculoskeletal system and connective tissue 11/11/2015    History of neck pain    Personal history of other diseases of the nervous system and sense organs 11/11/2014    History of tension headache    Personal history of other diseases of the nervous system and sense organs  12/21/2013    History of ear pain    Personal history of other diseases of the respiratory system 11/06/2016    History of bronchitis    Personal history of other medical treatment 02/23/2021    History of screening mammography    Personal history of other specified conditions 02/13/2021    History of dysuria    Personal history of other specified conditions 03/10/2021    History of epigastric pain         Patient Active Problem List   Diagnosis    Acne    Disorder of female genital organs    Dyspepsia    Elevated ferritin    Femoroacetabular impingement of left hip    Hair loss    Hemorrhoids    Labral tear of hip joint    Unilateral otalgia    Amenorrhea    Dense breast    Leg edema, right    Shortness of breath    Bronchitis    Onychomycosis of great toe    Routine health maintenance    Facial pain    Cold sore    Constipation    Dysuria    Presence of intrauterine contraceptive device        Past Surgical History:   Procedure Laterality Date    COLONOSCOPY  06/01/2021    Complete Colonoscopy    OTHER SURGICAL HISTORY  04/30/2020    Abdominal liposuction    SEPTOPLASTY  11/13/2017    Septoplasty        Family History   Problem Relation Name Age of Onset    Breast cancer Mother  53    Hypertension Mother      Hyperlipidemia Mother      Heart attack Father  45    Diabetes type II Father      Other (ESRD) Father          Dialysis    Other (Stroke Syndrome) Father      Breast cancer Sister  46        reurrence at 53  doing ok    Breast cancer Sister  46    No Known Problems Sister      No Known Problems Sister      Pancreatitis Brother  24        Acute    Other (Stroke Syndrome) Brother      Colon cancer Maternal Grandmother      Ovarian cancer Maternal Grandmother      Pancreatic cancer Maternal Grandmother      Breast cancer Paternal Grandmother      Breast cancer Other Maternal Aunt     Breast cancer Other Paternal Aunt         Social History     Social History Narrative    He is originally from the [place] area  "did travel around [place] in the  for a while has been back in [place] for about 5 years currently resides in my [place]    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    She is  to her  of 26 years    They do not have children    Her diet overall she feels is pretty healthy though travel makes it difficult    Exercise 4 days     She is not a smoker    Occasional alcohol  2-3 times monthly         No Known Allergies       Current Outpatient Medications:     albuterol (ProAir HFA) 90 mcg/actuation inhaler, Inhale 2 puffs every 4 hours if needed for wheezing or shortness of breath., Disp: 8.5 g, Rfl: 1    Arazlo 0.045 % lotion, , Disp: , Rfl:     minoxidil (Loniten) 2.5 mg tablet, Take 1 tablet (2.5 mg) by mouth once daily., Disp: , Rfl:     spironolactone (Aldactone) 100 mg tablet, Take 1 tablet (100 mg) by mouth once daily., Disp: , Rfl:     triamcinolone (Kenalog) 0.1 % oral paste, Apply to oral lesions 3 times daily as directed, Disp: 5 g, Rfl: 1    valACYclovir (Valtrex) 1 gram tablet, Take 1 tablet (1,000 mg) by mouth 2 times a day for 7 days. Take 2 tabs (2000 mg) 2 times a days for 1 day., Disp: 4 tablet, Rfl: 5     Review of systems was negative other than that listed in present history    Visit Vitals  /76   Pulse 93   Ht 1.689 m (5' 6.5\")   Wt 76.7 kg (169 lb)   SpO2 100%   BMI 26.87 kg/m²   OB Status Unknown   Smoking Status Never   BSA 1.9 m²    InBody scan   Weight 178.7  BMI 27.6  Percent body fat 38.2  Visceral fat 15    Physical Exam  Physical examination  Reveals a well-developed woman  in no acute distress    appearance is younger than stated age  HEENT exam  Extraocular motion is intact  Tympanic membranes and external auditory canals are normal  Oropharynx is normal  There is no cervical lymphadenopathy appreciated  The thyroid is within normal limits    Lungs    clear to auscultation and percussion    Cardiovascular "   regular rate and rhythm  No murmurs rubs or gallops are appreciated    Breast examination   No dominant masses nipple discharge or axillary lymphadenopathy is appreciated    Abdomen   soft nontender bowel sounds are positive   there is no organomegaly noted      Periphery  Pulses are present without deficits noted  No peripheral edema is noted    Musculoskeletal  Gait is normal  Is no joint erythema or swelling noted  Range of motion is within normal limits  Strength is 5 of 5 without deficits noted    Dermatology  No concerning skin lesions are noted    Neurology  No deficits are noted  Judgment appears appropriate  Mood and affect are appropriate       No results found for this or any previous visit (from the past 24 hour(s)).     Office Visit on 09/24/2024   Component Date Value Ref Range Status    POC Color, Urine 09/24/2024 Yellow  Straw, Yellow, Light-Yellow Final    POC Appearance, Urine 09/24/2024 Clear  Clear Final    POC Glucose, Urine 09/24/2024 NEGATIVE  NEGATIVE mg/dl Final    POC Bilirubin, Urine 09/24/2024 NEGATIVE  NEGATIVE Final    POC Ketones, Urine 09/24/2024 NEGATIVE  NEGATIVE mg/dl Final    POC Specific Gravity, Urine 09/24/2024 1.010  1.005 - 1.035 Final    POC Blood, Urine 09/24/2024 NEGATIVE  NEGATIVE Final    POC PH, Urine 09/24/2024 6.0  No Reference Range Established PH Final    POC Protein, Urine 09/24/2024 NEGATIVE  NEGATIVE, 30 (1+) mg/dl Final    POC Urobilinogen, Urine 09/24/2024 0.2  0.2, 1.0 EU/DL Final    Poc Nitrite, Urine 09/24/2024 NEGATIVE  NEGATIVE Final    POC Leukocytes, Urine 09/24/2024 NEGATIVE  NEGATIVE Final    WBC 09/24/2024 3.4 (L)  4.4 - 11.3 x10*3/uL Final    nRBC 09/24/2024 0.0  0.0 - 0.0 /100 WBCs Final    RBC 09/24/2024 4.60  4.00 - 5.20 x10*6/uL Final    Hemoglobin 09/24/2024 14.6  12.0 - 16.0 g/dL Final    Hematocrit 09/24/2024 43.3  36.0 - 46.0 % Final    MCV 09/24/2024 94  80 - 100 fL Final    MCH 09/24/2024 31.7  26.0 - 34.0 pg Final    MCHC 09/24/2024 33.7   32.0 - 36.0 g/dL Final    RDW 09/24/2024 14.0  11.5 - 14.5 % Final    Platelets 09/24/2024 247  150 - 450 x10*3/uL Final    Neutrophils % 09/24/2024 41.9  40.0 - 80.0 % Final    Immature Granulocytes %, Automated 09/24/2024 0.3  0.0 - 0.9 % Final    Immature Granulocyte Count (IG) includes promyelocytes, myelocytes and metamyelocytes but does not include bands. Percent differential counts (%) should be interpreted in the context of the absolute cell counts (cells/UL).    Lymphocytes % 09/24/2024 40.5  13.0 - 44.0 % Final    Monocytes % 09/24/2024 14.6  2.0 - 10.0 % Final    Eosinophils % 09/24/2024 1.8  0.0 - 6.0 % Final    Basophils % 09/24/2024 0.9  0.0 - 2.0 % Final    Neutrophils Absolute 09/24/2024 1.41  1.20 - 7.70 x10*3/uL Final    Percent differential counts (%) should be interpreted in the context of the absolute cell counts (cells/uL).    Immature Granulocytes Absolute, Au* 09/24/2024 0.01  0.00 - 0.70 x10*3/uL Final    Lymphocytes Absolute 09/24/2024 1.36  1.20 - 4.80 x10*3/uL Final    Monocytes Absolute 09/24/2024 0.49  0.10 - 1.00 x10*3/uL Final    Eosinophils Absolute 09/24/2024 0.06  0.00 - 0.70 x10*3/uL Final    Basophils Absolute 09/24/2024 0.03  0.00 - 0.10 x10*3/uL Final    Magnesium 09/24/2024 1.90  1.60 - 2.40 mg/dL Final    Thyroid Stimulating Hormone 09/24/2024 1.28  0.44 - 3.98 mIU/L Final    Uric Acid 09/24/2024 4.4  2.3 - 6.7 mg/dL Final    Venipuncture immediately after or during the administration of Metamizole may lead to falsely low results. Testing should be performed immediately  prior to Metamizole dosing.    Vitamin B12 09/24/2024 >2,000 (H)  211 - 911 pg/mL Final    Vitamin D, 25-Hydroxy, Total 09/24/2024 29 (L)  30 - 100 ng/mL Final    Glucose 09/24/2024 91  74 - 99 mg/dL Final    Sodium 09/24/2024 134 (L)  136 - 145 mmol/L Final    Potassium 09/24/2024 4.5  3.5 - 5.3 mmol/L Final    Chloride 09/24/2024 100  98 - 107 mmol/L Final    Bicarbonate 09/24/2024 28  21 - 32 mmol/L Final     Anion Gap 09/24/2024 11  10 - 20 mmol/L Final    Urea Nitrogen 09/24/2024 11  6 - 23 mg/dL Final    Creatinine 09/24/2024 0.83  0.50 - 1.05 mg/dL Final    eGFR 09/24/2024 89  >60 mL/min/1.73m*2 Final    Calculations of estimated GFR are performed using the 2021 CKD-EPI Study Refit equation without the race variable for the IDMS-Traceable creatinine methods.  https://jasn.asnjournals.org/content/early/2021/09/22/ASN.1930953411    Calcium 09/24/2024 9.3  8.6 - 10.3 mg/dL Final    Albumin 09/24/2024 4.1  3.4 - 5.0 g/dL Final    Alkaline Phosphatase 09/24/2024 35  33 - 110 U/L Final    Total Protein 09/24/2024 7.2  6.4 - 8.2 g/dL Final    AST 09/24/2024 13  9 - 39 U/L Final    Bilirubin, Total 09/24/2024 0.7  0.0 - 1.2 mg/dL Final    ALT 09/24/2024 11  7 - 45 U/L Final    Patients treated with Sulfasalazine may generate falsely decreased results for ALT.    CRP, High Sensitivity 09/24/2024 6.0 (H)  <1.0 mg/L Final    Ferritin 09/24/2024 207 (H)  8 - 150 ng/mL Final    Hemoglobin A1C 09/24/2024 5.2  See comment % Final    Estimated Average Glucose 09/24/2024 103  Not Established mg/dL Final    Insulin, Fasting 09/24/2024 12  3 - 25 uIU/mL Final    Iron 09/24/2024 64  35 - 150 ug/dL Final    UIBC 09/24/2024 274  110 - 370 ug/dL Final    TIBC 09/24/2024 338  240 - 445 ug/dL Final    % Saturation 09/24/2024 19 (L)  25 - 45 % Final    Cholesterol 09/24/2024 186  0 - 199 mg/dL Final          Age      Desirable   Borderline High   High     0-19 Y     0 - 169       170 - 199     >/= 200    20-24 Y     0 - 189       190 - 224     >/= 225         >24 Y     0 - 199       200 - 239     >/= 240   **All ranges are based on fasting samples. Specific   therapeutic targets will vary based on patient-specific   cardiac risk.    Pediatric guidelines reference:Pediatrics 2011, 128(S5).Adult guidelines reference: NCEP ATPIII Guidelines,SARITA 2001, 258:2486-97    Venipuncture immediately after or during the administration of Metamizole may  lead to falsely low results. Testing should be performed immediately prior to Metamizole dosing.    HDL-Cholesterol 09/24/2024 62.4  mg/dL Final      Age       Very Low   Low     Normal    High    0-19 Y    < 35      < 40     40-45     ----  20-24 Y    ----     < 40      >45      ----        >24 Y      ----     < 40     40-60      >60      Cholesterol/HDL Ratio 09/24/2024 3.0   Final      Ref Values  Desirable  < 3.4  High Risk  > 5.0    LDL Calculated 09/24/2024 109 (H)  <=99 mg/dL Final                                Near   Borderline      AGE      Desirable  Optimal    High     High     Very High     0-19 Y     0 - 109     ---    110-129   >/= 130     ----    20-24 Y     0 - 119     ---    120-159   >/= 160     ----      >24 Y     0 -  99   100-129  130-159   160-189     >/=190      VLDL 09/24/2024 14  0 - 40 mg/dL Final    Triglycerides 09/24/2024 71  0 - 149 mg/dL Final       Age         Desirable   Borderline High   High     Very High   0 D-90 D    19 - 174         ----         ----        ----  91 D- 9 Y     0 -  74        75 -  99     >/= 100      ----    10-19 Y     0 -  89        90 - 129     >/= 130      ----    20-24 Y     0 - 114       115 - 149     >/= 150      ----         >24 Y     0 - 149       150 - 199    200- 499    >/= 500    Venipuncture immediately after or during the administration of Metamizole may lead to falsely low results. Testing should be performed immediately prior to Metamizole dosing.    Non HDL Cholesterol 09/24/2024 124  0 - 149 mg/dL Final          Age       Desirable   Borderline High   High     Very High     0-19 Y     0 - 119       120 - 144     >/= 145    >/= 160    20-24 Y     0 - 149       150 - 189     >/= 190      ----         >24 Y    30 mg/dL above LDL Cholesterol goal      Lipoprotein (a) 09/24/2024 92 (H)  <=29 mg/dL Final    Performed By: [facility]  [address] [place] [id number]  : Rios Wall MD, PhD  CLIA Number:  "[id number]       No results found.         Vision Screening    20/30  both near and far  Hearing screen is normal  Bone density test is normal    Stress test is normal    Vascular studies are normal showing no evidence of carotid artery stenosis and no evidence of abdominal aortic aneurysm    At the time of this note mammogram results are pending        === 06/01/23 ===    CT CARDIAC SCORING WO IV CONTRAST    - Impression -  1. Coronary artery calcium score of 0*.    *Coronary artery calcium scoring may be helpful in predicting the  risk for future coronary heart disease events.  According to the  American College of Cardiology Foundation Clinical Expert Consensus  Task Force, such testing provides important prognostic information in  patients with more than one coronary heart disease risk factor. The  coronary artery calcium score correlates with the annual risk of a  non-fatal myocardial infarction or coronary heart disease death.    Coronary artery score            Annual Risk    0-99                             0.4%  100-399                        1.3%  >400                            2.4%    These three \"breakpoints\" correspond to lower, intermediate and high  risk states for future coronary events.  Such information should be  used, along with appropriate clinical judgment, to make decisions  regarding the intensity of risk factor management strategies to treat  blood lipids and to modify other non-lipid coronary risk factors.    === 09/24/24 ===    XR CHEST 2 VIEWS    - Impression -  No acute cardiopulmonary disease.    MACRO:  none    Signed by: Ester Oneill 9/25/2024 2:39 PM  Dictation workstation:   [id number]            Assessment/Plan       Discussion/Summary  In summary     Notable lab work     Total cholesterol 186 desired range less than 200  HDL which is the good cholesterol  62.4  LDL    109 desired range less than 100  Triglycerides  71 with desired range less than 150  Vitamin D is slightly low " value 29 with desired range   Iron is within normal range at 64  Ferritin is elevated 207 with normal range 8-150  Hematocrit is normal at 43.3 normal range 36-46    LP(a) a 92 with desired range less than 29 this is an independent risk factor for heart disease    Vitamin D 29 desired range   The remainder of blood work was all acceptable        Recommendations     It was great to see you again!  I am glad you are doing so well overall  As we discussed I am concerned about the weight gain and increased weight compared to last year especially with increased percent body fat and visceral fat levels on the InBody Scan.   Cholesterol was also elevated compared to previous year.  LP(a) is elevated which is an independent risk factor for heart disease and tends to be a genetic.  The good news is your stress test is normal  You did have previous coronary artery calcium score of 0 as well    Vitamin D is slightly low and I do recommend vitamin D3 about 2000 units daily    I know it is difficult with your travel schedule but hoping you can institute some changes as recommended from nutritionist and training staff.  As we discussed my biggest concern for you is risk of cancer throughout your life time because of the strong family history of multiple cancers.  I do want you to follow-up with high risk breast clinic and I have placed that order  I am glad you have completed your colonoscopy  As we discussed at some point in the future be a very good candidate for Galleri testing which is a screening  blood test for multiple cancers.  Iron level and blood count were normal ferritin elevated so looks like it is about time to donate blood for the hemochromatosis        Health maintenance    Tdap or tetanus booster every 10 years  Shingrix or shingles vaccination at age 50.  This is a series of 2 vaccinations the second vaccination is given 2 to 6 months following the first  Pneumonia vaccination with Prevnar 20 at age  65  Colon cancer screening starting at age 45 colonoscopy or if no risks could then consider Cologuard as well    Women  Yearly mammograms starting at age 40  Pap smears every 3 to 5 years  until age 65      General Wellness Tips: Please continue with a balanced diet and a regular physical activity program for at least 150 minutes/week of moderate exercise and 30 minutes/week of resistance/weight training per week.  Try to get 6 to 8 hours of sleep per night.  Please download the calm or headspace ivonne from the [employer] to assist with stress and sleep management if necessary.      I hope you found your day to be worthwhile and informative and I look forward to our continued relationship.  In conclusion,  I wish to thank you for attending the  executive health program at [facility].  I wish you and your family a safe and healthy year.    Please email me at [email or web address] or call me at [phone] if you have any questions pertaining to this report or any other medical concerns.    In good health,    Viviane Monroy        This note was partially generated using the Dragon voice recognition system.  There may be some incorrect wording ,grammar, spelling or punctuation errors that were not corrected prior to committing the note to the medical record.

## 2024-09-24 ENCOUNTER — HOSPITAL ENCOUNTER (OUTPATIENT)
Dept: VASCULAR MEDICINE | Facility: HOSPITAL | Age: 44
Discharge: HOME | End: 2024-09-24
Payer: COMMERCIAL

## 2024-09-24 ENCOUNTER — HOSPITAL ENCOUNTER (OUTPATIENT)
Dept: RADIOLOGY | Facility: HOSPITAL | Age: 44
Discharge: HOME | End: 2024-09-24
Payer: COMMERCIAL

## 2024-09-24 ENCOUNTER — HOSPITAL ENCOUNTER (OUTPATIENT)
Dept: RADIOLOGY | Facility: CLINIC | Age: 44
Discharge: HOME | End: 2024-09-24
Payer: COMMERCIAL

## 2024-09-24 ENCOUNTER — ALLIED HEALTH (OUTPATIENT)
Dept: INTEGRATIVE MEDICINE | Facility: CLINIC | Age: 44
End: 2024-09-24

## 2024-09-24 ENCOUNTER — HOSPITAL ENCOUNTER (OUTPATIENT)
Dept: CARDIOLOGY | Facility: HOSPITAL | Age: 44
Discharge: HOME | End: 2024-09-24
Payer: COMMERCIAL

## 2024-09-24 ENCOUNTER — NUTRITION (OUTPATIENT)
Dept: PRIMARY CARE | Facility: CLINIC | Age: 44
End: 2024-09-24

## 2024-09-24 ENCOUNTER — APPOINTMENT (OUTPATIENT)
Dept: PRIMARY CARE | Facility: CLINIC | Age: 44
End: 2024-09-24

## 2024-09-24 VITALS
WEIGHT: 169 LBS | OXYGEN SATURATION: 100 % | HEART RATE: 93 BPM | DIASTOLIC BLOOD PRESSURE: 76 MMHG | BODY MASS INDEX: 26.53 KG/M2 | SYSTOLIC BLOOD PRESSURE: 118 MMHG | HEIGHT: 67 IN

## 2024-09-24 DIAGNOSIS — Z00.00 HEALTH MAINTENANCE EXAMINATION: ICD-10-CM

## 2024-09-24 DIAGNOSIS — Z91.89 AT HIGH RISK FOR BREAST CANCER: ICD-10-CM

## 2024-09-24 DIAGNOSIS — Z13.6 ENCOUNTER FOR SCREENING FOR CARDIOVASCULAR DISORDERS: ICD-10-CM

## 2024-09-24 DIAGNOSIS — Z00.00 HEALTH MAINTENANCE EXAMINATION: Primary | ICD-10-CM

## 2024-09-24 DIAGNOSIS — Z86.19 HX OF COLD SORES: Primary | ICD-10-CM

## 2024-09-24 DIAGNOSIS — Z00.00 ROUTINE HEALTH MAINTENANCE: Primary | ICD-10-CM

## 2024-09-24 DIAGNOSIS — K12.0 CANKER SORE: ICD-10-CM

## 2024-09-24 LAB
25(OH)D3 SERPL-MCNC: 29 NG/ML (ref 30–100)
ALBUMIN SERPL BCP-MCNC: 4.1 G/DL (ref 3.4–5)
ALP SERPL-CCNC: 35 U/L (ref 33–110)
ALT SERPL W P-5'-P-CCNC: 11 U/L (ref 7–45)
ANION GAP SERPL CALC-SCNC: 11 MMOL/L (ref 10–20)
AST SERPL W P-5'-P-CCNC: 13 U/L (ref 9–39)
BASOPHILS # BLD AUTO: 0.03 X10*3/UL (ref 0–0.1)
BASOPHILS NFR BLD AUTO: 0.9 %
BILIRUB SERPL-MCNC: 0.7 MG/DL (ref 0–1.2)
BUN SERPL-MCNC: 11 MG/DL (ref 6–23)
CALCIUM SERPL-MCNC: 9.3 MG/DL (ref 8.6–10.3)
CHLORIDE SERPL-SCNC: 100 MMOL/L (ref 98–107)
CHOLEST SERPL-MCNC: 186 MG/DL (ref 0–199)
CHOLESTEROL/HDL RATIO: 3
CO2 SERPL-SCNC: 28 MMOL/L (ref 21–32)
CREAT SERPL-MCNC: 0.83 MG/DL (ref 0.5–1.05)
CRP SERPL HS-MCNC: 6 MG/L
EGFRCR SERPLBLD CKD-EPI 2021: 89 ML/MIN/1.73M*2
EOSINOPHIL # BLD AUTO: 0.06 X10*3/UL (ref 0–0.7)
EOSINOPHIL NFR BLD AUTO: 1.8 %
ERYTHROCYTE [DISTWIDTH] IN BLOOD BY AUTOMATED COUNT: 14 % (ref 11.5–14.5)
EST. AVERAGE GLUCOSE BLD GHB EST-MCNC: 103 MG/DL
FERRITIN SERPL-MCNC: 207 NG/ML (ref 8–150)
GLUCOSE SERPL-MCNC: 91 MG/DL (ref 74–99)
HBA1C MFR BLD: 5.2 %
HCT VFR BLD AUTO: 43.3 % (ref 36–46)
HDLC SERPL-MCNC: 62.4 MG/DL
HGB BLD-MCNC: 14.6 G/DL (ref 12–16)
IMM GRANULOCYTES # BLD AUTO: 0.01 X10*3/UL (ref 0–0.7)
IMM GRANULOCYTES NFR BLD AUTO: 0.3 % (ref 0–0.9)
INSULIN P FAST SERPL-ACNC: 12 UIU/ML (ref 3–25)
IRON SATN MFR SERPL: 19 % (ref 25–45)
IRON SERPL-MCNC: 64 UG/DL (ref 35–150)
LDLC SERPL CALC-MCNC: 109 MG/DL
LYMPHOCYTES # BLD AUTO: 1.36 X10*3/UL (ref 1.2–4.8)
LYMPHOCYTES NFR BLD AUTO: 40.5 %
MAGNESIUM SERPL-MCNC: 1.9 MG/DL (ref 1.6–2.4)
MCH RBC QN AUTO: 31.7 PG (ref 26–34)
MCHC RBC AUTO-ENTMCNC: 33.7 G/DL (ref 32–36)
MCV RBC AUTO: 94 FL (ref 80–100)
MONOCYTES # BLD AUTO: 0.49 X10*3/UL (ref 0.1–1)
MONOCYTES NFR BLD AUTO: 14.6 %
NEUTROPHILS # BLD AUTO: 1.41 X10*3/UL (ref 1.2–7.7)
NEUTROPHILS NFR BLD AUTO: 41.9 %
NON HDL CHOLESTEROL: 124 MG/DL (ref 0–149)
NRBC BLD-RTO: 0 /100 WBCS (ref 0–0)
PLATELET # BLD AUTO: 247 X10*3/UL (ref 150–450)
POC APPEARANCE, URINE: CLEAR
POC BILIRUBIN, URINE: NEGATIVE
POC BLOOD, URINE: NEGATIVE
POC COLOR, URINE: YELLOW
POC GLUCOSE, URINE: NEGATIVE MG/DL
POC KETONES, URINE: NEGATIVE MG/DL
POC LEUKOCYTES, URINE: NEGATIVE
POC NITRITE,URINE: NEGATIVE
POC PH, URINE: 6 PH
POC PROTEIN, URINE: NEGATIVE MG/DL
POC SPECIFIC GRAVITY, URINE: 1.01
POC UROBILINOGEN, URINE: 0.2 EU/DL
POTASSIUM SERPL-SCNC: 4.5 MMOL/L (ref 3.5–5.3)
PROT SERPL-MCNC: 7.2 G/DL (ref 6.4–8.2)
RBC # BLD AUTO: 4.6 X10*6/UL (ref 4–5.2)
SODIUM SERPL-SCNC: 134 MMOL/L (ref 136–145)
TIBC SERPL-MCNC: 338 UG/DL (ref 240–445)
TRIGL SERPL-MCNC: 71 MG/DL (ref 0–149)
TSH SERPL-ACNC: 1.28 MIU/L (ref 0.44–3.98)
UIBC SERPL-MCNC: 274 UG/DL (ref 110–370)
URATE SERPL-MCNC: 4.4 MG/DL (ref 2.3–6.7)
VIT B12 SERPL-MCNC: >2000 PG/ML (ref 211–911)
VLDL: 14 MG/DL (ref 0–40)
WBC # BLD AUTO: 3.4 X10*3/UL (ref 4.4–11.3)

## 2024-09-24 PROCEDURE — 93017 CV STRESS TEST TRACING ONLY: CPT

## 2024-09-24 PROCEDURE — 93978 VASCULAR STUDY: CPT | Performed by: INTERNAL MEDICINE

## 2024-09-24 PROCEDURE — 93880 EXTRACRANIAL BILAT STUDY: CPT | Mod: REDUCED SERVICES | Performed by: INTERNAL MEDICINE

## 2024-09-24 PROCEDURE — 93018 CV STRESS TEST I&R ONLY: CPT | Performed by: INTERNAL MEDICINE

## 2024-09-24 PROCEDURE — 71046 X-RAY EXAM CHEST 2 VIEWS: CPT

## 2024-09-24 PROCEDURE — SMAR2 SMART-UHCSM: Performed by: INTERNAL MEDICINE

## 2024-09-24 PROCEDURE — 77067 SCR MAMMO BI INCL CAD: CPT

## 2024-09-24 PROCEDURE — 93880 EXTRACRANIAL BILAT STUDY: CPT | Mod: 52

## 2024-09-24 PROCEDURE — 93978 VASCULAR STUDY: CPT

## 2024-09-24 PROCEDURE — 93016 CV STRESS TEST SUPVJ ONLY: CPT | Performed by: INTERNAL MEDICINE

## 2024-09-24 RX ORDER — VALACYCLOVIR HYDROCHLORIDE 1 G/1
1000 TABLET, FILM COATED ORAL 2 TIMES DAILY
Qty: 4 TABLET | Refills: 5 | Status: SHIPPED | OUTPATIENT
Start: 2024-09-24 | End: 2024-10-01

## 2024-09-24 RX ORDER — TRIAMCINOLONE ACETONIDE 1 MG/G
PASTE DENTAL
Qty: 5 G | Refills: 1 | Status: SHIPPED | OUTPATIENT
Start: 2024-09-24

## 2024-09-24 NOTE — PROGRESS NOTES
Reason for Nutrition Visit:  It was a pleasure speaking with Ms. De La Fuente again to discuss diet and nutrition as part of her [facility] follow-up.    Medication Documentation Review Audit       Reviewed by Leatha Alan CMA (Medical Assistant) on 09/24/24 at 1012      Medication Order Taking? Sig Documenting Provider Last Dose Status   albuterol (ProAir HFA) 90 mcg/actuation inhaler [id number]  Inhale 2 puffs every 4 hours if needed for wheezing or shortness of breath. Tatiana Shahid MD  Active   Arazlo 0.045 % lotion [id number] No  Historical Provider, MD Taking Active   minoxidil (Loniten) 2.5 mg tablet [id number] No Take 1 tablet (2.5 mg) by mouth once daily. Historical Provider, MD Taking Active   spironolactone (Aldactone) 100 mg tablet [id number] No Take 1 tablet (100 mg) by mouth once daily. Historical Provider, MD Taking Active                     Past Medical Hx:  Patient Active Problem List   Diagnosis    Acne    Disorder of female genital organs    Dyspepsia    Elevated ferritin    Femoroacetabular impingement of left hip    Hair loss    Hemorrhoids    Labral tear of hip joint    Unilateral otalgia    Amenorrhea    Dense breast    Leg edema, right    Shortness of breath    Bronchitis    Onychomycosis of great toe    Annual physical exam    Facial pain    Cold sore    Constipation    Dysuria    Presence of intrauterine contraceptive device        Weight change:  Her current weight is 169lbs.  Her DBW is 145lbs.   She has gained 11lbs.    Significant Weight Change: Yes    Lab Results   Component Value Date    HGBA1C 5.7 (A) 06/01/2023    CHOL 148 06/01/2023    TRIG 63 06/01/2023    HDL 55.5 06/01/2023    LDLF 80 06/01/2023        Food and Nutrition Hx:  She is traveling a lot for work, mostly international.  It has been even more in the past 6 months.      She does not eat breakfast currently.  She is not usually hungry for breakfast.  She eats lunch and dinner consistently.  She is eating fruit for  snacks in the afternoon and evening.  When she is home, she is eating out twice a month.  She packs her lunches for work.  She is eating fish twice a week.      Food Recall:  Breakfast: skipped  11:30am: 1 cup cooked kale, 5oz blackened salmon, 12oz Diet Dr. Pepper  4:00pm: 2 cups grapes  6:30pm: 1 whole chicken wine, 1 cup cooked kale  8:30pm: apple    Beverages: water-60-70oz a day; tea-8oz, 3 times a week; diet gingerale-3 times a week; V8 peach raquel-once a week; alcohol-2 drinks a month    Allergies: None  Intolerance: None    GI Symptoms : constipation Frequency: occasional  When she travels, she gets constipated.  She will have a bowel movement 50% of the time.  She has normal bowel movements when she is home.    Exercise: when she travels she uses an tanesha in her hotel room; at home she will ride bike, treadmill, or hike.  She is exercising 3-4 days a week, 30-45 minutes.      Sleep duration/quality : 5.5-7 hours a night;     Supplements: Denies     Cravings: Salty  Energy Levels: High      Estimated Nutrient Needs:    Calories for Weight Maintenance: 2010 ([place] [address] Jeor x 1.4 activity factor)  Calories for Weight Loss: 2056-6148  Protein Needs: 115g/day (0.8g/lb DBW, 145lbs)    Nutrition Diagnosis:    Diagnosis Statement 1:  Diagnosis Status: Ongoing  Diagnosis : Altered nutrition related lab values  related to  dietary and physiological factors  as evidenced by  elevated LDL cholesterol (109mg/dl) , insufficient Vitamin D (29ng/dl)    Diagnosis Statement 2:  Diagnosis Status: Ongoing  Diagnosis : Inadequate protein intake  related to  food- and nutrition-related knowledge deficit regarding appropriate protein intake  as evidenced by  food recall showing she is not meeting the recommended 115g protein per day    Diagnosis Statement 3:   Diagnosis Status: New  Diagnosis : Inadequate fiber intake  related to food and nutrition related knowledge deficit concerning desirable quantities of fiber as evidenced by   food recall showing she is not meeting the recommended 30g fiber per day    Nutrition Interventions:  Increased Fiber Diet and Increased Protein Diet      Nutrition Goals:  Nutrition Goals : Blood glucose control  Consistent meal/snack pattern  Consume prescribed supplements  Initiate Exercise Regimen  Reduce Kcal Intake  Reduce LDL level  Weight Loss  Adequate protein intake  Adequate fiber intake    Nutrition Recommendations:    1) Ensure you are getting adequate amounts of protein consistently throughout the day.  Your daily protein goal is 115g.  Aim for 30-40g per meal and include 5-10g protein at snacks.    2) Aim for 30g fiber daily.  One way to help you reach this goal is to include non-starchy vegetables with both lunch and dinner (at least 1-2 cups).  Be sure to include a wide variety of colorful vegetables throughout the week.  Also, be sure to use 100% whole wheat/whole grain breads/pastas, brown/wild rice, quinoa, oats, beans, lentils, starchy vegetables-potatoes, sweet potatoes, corn, peas, winter squash and limit/avoid white, processed carbohydrates (white bread, white pasta, white rice, etc).    3) Choose 3 out of 5 of the following daily to help you reach your daily fiber goals:  -1 cup berries (4-5g fiber)  -1/2 cup beans (7g fiber)  -1/4 cup nuts (5g fiber)  -1/3 avocado (4g fiber)  -3 cups greens (5g fiber)    4) Begin to eat breakfast more consistently throughout the week to help you reach your daily protein and fiber goals.  Options for breakfast to increase protein and fiber at this meal:  -OWYN Pro Elite pre-made protein drink (32g protein per bottle) + berries  -Greek yogurt + Elsa Crunch cereal + berries + nuts/seeds  -1 cup cottage cheese + berries    5) High protein and high fiber snacks to take with you when you travel for work:  -nuts, seeds  -Aries's nut butter packets  -Elsa Crunch cereal  -Chomps meat sticks  -protein bars-RX, Aloha, No Cow    6) Instead of Vlassic pickles,  switch to a fermented pickle option to obtain the beneficial probiotics.  Recommended brands: Bubbies, Munguia Kitchen, Sonny's pickles.    7) Take electrolytes with you when traveling: Nuun or LMNT    8) Supplement recommendations:  -AG1 powder, 1 individual packet daily.  -Pure Encapsulations magnesium citrate, 2 capsules at night.  Can take 3-4 capsules at night when traveling to help prevent constipation.  -Aleksandar liquid Vitamin D3, 4-6 drops per day    9) Incorporate strength training 2-3 days a week

## 2024-09-24 NOTE — PROGRESS NOTES
Toya De La Fuente is a 43 y.o. female presents to [facility] for stress management and resilience training in coordination with  [facility].     Toya and her  have been  for 25 years.  She is very committed to work and to her career.  She has very little free time which she likes to spend with her .  Her social [place] has shrunk over the years as a result.  Her parents and siblings live close by and they her primary social contacts.  At one point she volunteered as a  for local [employer], and she found that work to be very rewarding.  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    Toya reports that most of her stress is around her job.  She has always been very confident with her work, but recently she finds her self distracted by physical sensations and negative thinking that occurred in stressful situations.  She will experience palpitations, headaches and finds her mind racing.  Her thoughts distract her from the present moment.  She finds her mind wandering off during meetings thinking about what could go wrong the meeting or contemplating scenarios where she may not be able to answer her clients questions.  This is a new phenomenon for her.    She quit drinking alcohol completely to improve her mental clarity and focus at work.  She enjoys reading and keeps up with that, and she likes to spend time outside when possible biking and hiking.    Are you aware of stress in your life: yes  Do you have a plan in place for managing stress: less alcohol, time for reading, exercise    Current sources of stress (acute vs chronic?):  Psychological (thoughts, beliefs, perceptions): worry about not being perceived as an expert at work   Physiological (illness, infection, disease) none  Social (major life changes, personal conflicts, finances) Increased travel for work  Bioecological (environment) none    Manifestation/Awareness  "of Stress:   Body/Physical (palpitations, shortness of breath, sweating, fatigue, nausea, dizzy, headache, tremor, acid reflux, diarrhea): palpitations, headache, racing throughts  Mental (ex. poor focus, rumination, difficulty with sleep): rumination, poor concentration  Emotional (ex. Irritability, withdrawn, depressed, anxious): feeling anxious, insomnia     Adverse Health Effects of Stress:  None      Current Self-Care/Stress Management Strategy:  Chronic stress can effect your ability to perform optimally both mentally and physically and has been linked to inflammatory conditions and chronic diseases such as elevated cholesterol, diabetes, and gastric ulcers.   When the parasympathetic nervous system is activated, the body enters a state of relaxation, allowing for recovery, repair, and immune system activation   Currently:   Reading and some exercise   Anything you have been considering that you haven't tried yet?   Mindfulness practices, volunteering      Building Resilience   Breathing Tools  Lengthening exhale breath  Abdominal breathing     Mindful Eating (why and how):  Current:   Recommended sitting for meals, away from your desk  Discontinue working, electronics, stressful conversations, news, etc. during mealtime  3 deep breaths, feel feet on floor  \"Breathing in I calm my body, breathing out, I smile\" x 3  Engage your senses. Feel gratitude for your food  Feel the saliva pool in your mouth prior to taking the first bite.   Mindful eating improves overall digestion.   Eat the Olivia  Gut brain connection, mental health  Book: \"This is Your Brain on Food\" Dr. Mary Hong    2. Enjoyable Movement:   Yoga  Luis Daniel Chi   Qigong  Physical Fitness       3. Restorative Sleep  Quantity (how much):  Quality (continuous vs. fragmented):   Regularity (sleep/wake time the same +/- 30 mins):   The first half of the night is dominated by non-REM deep sleep; the second half of our sleep is dominated by REM sleep  If you " "sleep hours later than usual (maybe because of an event) - you will likely have more REM sleep cycles and less deep sleep  Deep sleep (non-REM) is critical for learning and memory, immune repair and replenishment, metabolic function (blood sugar regulation), hormone optimization  REM sleep allows you to dream, problem solve, and regulate emotions.     Timing (in alignment with chronotype):   Chronotypes: (1) extreme morning type (sleep at 8pm, wake up at 4am); (2) morning type (sleep at 9pm, wake up at 6am); (3) neutral (sleep at 11pm, wake up at 7:30am); (4) evening type (sleep at 1am, wake up around 9:30am); (5) extreme evening type (sleep at 2:30am, waking up mid morning)   Last food:  Last exercise:   Last caffeine:   Last alcohol:   Time spent on social media:   Bedroom Temp:   Optimal temperature for sleep is 65 degrees Fahrenheit (18.3 degrees Celsius)  To optimize circadian rhythm may consider removing unnecessary sources of light from the bedroom in addition to utilizing a sleep mask or blackout shades.    Remove electronics (phone, TV, computers, etc.) from the bedroom as these are sources of \"blue light\" which disrupts the circadian rhythm.   Medications/supplements for sleep?     3. Spirituality and Social Connection  Spirituality  Social connection:   Social connection improves your ability to recover from stress, anxiety, and depression as well as to experience a greater sense of overall well-being.  Volunteering  Time with family and friends     4. Meditation    5. Connect with Nature    6. Self-expression (music, singing, art, dancing, woodworking    7. Massage    8. Listening to Music     9. Self Management Techniques   -MBSR/managing stress response   -CBT   -Time Management Skills   -Positive Psychology / Gratitude     KPI  Heart Rate Variability (HRV)  Reflects the heart’s adaptability to different situations and provides insights into stress levels and overall health and well-being.  HRV is " unique to each individual.   A normal HRV for adults ranges from below 20 to over 200 milliseconds.  Each person’s HRV is unique, so compare your HRV to your averages and avoid comparisons to others.   It’s perfectly normal to observe daily and seasonal fluctuations in your HRV, a sign of your heart's adaptability.  Autonomic nervous system  Higher HRV associated with rest-and-digest, general fitness, and good recovery  Your rest-and-digest system (PNS) tells your heart to slow down, making room for variability between beats  Lower HRV with fight-or-flight responses, stress, illness, or over-training  Your fight-or-flight system (SNS) tells your heart to speed up, limiting space for variability (lower HRV)  Increasing HRV  Practice deep breathing exercises and meditations  Engage in regular physical activity  Prioritize restful sleep and address sleep disturbances  Focus on nourishing foods and stay hydrated    Vagus Nerve  Understanding the role of the vagus nerve in HRV and stress reduction is key.   Activating this nerve can be beneficial in reducing stress, increasing HRV, and improving vagal tone. A higher vagal tone is associated with better executive cognitive performance, emotional regulation, and overall health.    Cold water therapy:  Immerse your face in a bowl of ice water for 5-10 seconds.  End your shower with 30-60 seconds of cold water; increase as tolerated.  Advance to cold baths/plunges if desired (max benefit at 10 min/week).  Music therapy:  Humming, singing, playing an instrument, or dancing      Assessment/Plan:   Toya is aware of increasing levels of psychosocial stress in her life.  She is able to identify physical sensations associated with stress, and she has identified negative thought patterns around not being prepared during meetings or not being perceived is competent or expert level by clients.  We discussed using breathing exercises and cultivating a mindfulness practice to  develop skills of nonreactivity to the physical sensations.  We discussed the concepts of self compassion and self acceptance and use of curiosity to explore negative thoughts that occur in stressful job situations.  I directed her to an online resource called unwinding anxiety, and I recommended readings by Dino Velazquez, Mikayla Lomax and Chester Moreno.  We discussed the importance of maintaining social connections as a means to improve mental and overall health.  She enjoyed volunteering at a local nonprofit and will look into getting involved again on a more casual basis.  We discussed the importance of springtime green space in order to manage stress and improve mood, we discussed the importance of reserving time to schedule for pleasurable activities.  She is particularly fond of reading and will build time into  her schedule specifically for this activity.  Discussed physiologic changes that occur with acute vs. chronic stress, the autonomic nervous system, evidence re: neuroplasticity of mindfulness, and different mindfulness practices. Also discussed lifestyle habits that support the Body's natural defense mechanisms.  Practiced 4-2-8-2 breathing together  Provided information on additional breathing exercises.   Recommended apps:   Guided meditations:   "[employer] guided meditations   Unwinding Anxiety  Headspace tanesha  Calm tanesha  [place] tanesha  Heart Math device/tanesha  Muse device/tanesha  Yoga Online:  Yoga with Juanita [email or web address] (YouTube)  Yoga with Chris [email or web address] (YouTube)  [employer]

## 2024-09-25 ENCOUNTER — APPOINTMENT (OUTPATIENT)
Dept: GASTROENTEROLOGY | Facility: EXTERNAL LOCATION | Age: 44
End: 2024-09-25
Payer: COMMERCIAL

## 2024-09-25 VITALS
WEIGHT: 170 LBS | HEART RATE: 81 BPM | OXYGEN SATURATION: 100 % | SYSTOLIC BLOOD PRESSURE: 110 MMHG | BODY MASS INDEX: 27.32 KG/M2 | HEIGHT: 66 IN | RESPIRATION RATE: 17 BRPM | DIASTOLIC BLOOD PRESSURE: 75 MMHG | TEMPERATURE: 96.8 F

## 2024-09-25 DIAGNOSIS — Z12.11 COLON CANCER SCREENING: ICD-10-CM

## 2024-09-25 LAB
LPA SERPL-MCNC: 92 MG/DL
PREGNANCY TEST URINE, POC: NEGATIVE

## 2024-09-25 PROCEDURE — 81025 URINE PREGNANCY TEST: CPT | Performed by: STUDENT IN AN ORGANIZED HEALTH CARE EDUCATION/TRAINING PROGRAM

## 2024-09-25 PROCEDURE — 45385 COLONOSCOPY W/LESION REMOVAL: CPT | Performed by: STUDENT IN AN ORGANIZED HEALTH CARE EDUCATION/TRAINING PROGRAM

## 2024-09-25 RX ORDER — LIDOCAINE HYDROCHLORIDE 20 MG/ML
INJECTION, SOLUTION INFILTRATION; PERINEURAL AS NEEDED
Status: DISCONTINUED | OUTPATIENT
Start: 2024-09-25 | End: 2024-09-25

## 2024-09-25 RX ORDER — PROPOFOL 10 MG/ML
INJECTION, EMULSION INTRAVENOUS AS NEEDED
Status: DISCONTINUED | OUTPATIENT
Start: 2024-09-25 | End: 2024-09-25

## 2024-09-25 RX ORDER — MIDAZOLAM HYDROCHLORIDE 1 MG/ML
INJECTION, SOLUTION INTRAMUSCULAR; INTRAVENOUS AS NEEDED
Status: DISCONTINUED | OUTPATIENT
Start: 2024-09-25 | End: 2024-09-25

## 2024-09-25 RX ORDER — SODIUM CHLORIDE 9 MG/ML
20 INJECTION, SOLUTION INTRAVENOUS CONTINUOUS
Status: DISCONTINUED | OUTPATIENT
Start: 2024-09-25 | End: 2024-09-26 | Stop reason: HOSPADM

## 2024-09-25 SDOH — HEALTH STABILITY: MENTAL HEALTH: CURRENT SMOKER: 0

## 2024-09-25 ASSESSMENT — PAIN SCALES - GENERAL
PAINLEVEL_OUTOF10: 0 - NO PAIN
PAIN_LEVEL: 0
PAINLEVEL_OUTOF10: 0 - NO PAIN

## 2024-09-25 ASSESSMENT — PAIN - FUNCTIONAL ASSESSMENT
PAIN_FUNCTIONAL_ASSESSMENT: 0-10
PAIN_FUNCTIONAL_ASSESSMENT: 0-10

## 2024-09-25 NOTE — H&P
Outpatient NR Procedure H&P    Patient Profile  Name Toya De La Fuente  Date of Birth 1980  MRN [id number]  PCP Tatiana Shahid        Diagnosis: Colon cancer screening.  Procedure(s):  Colonoscopy.    Allergies  No Known Allergies    Past Medical History   Past Medical History:   Diagnosis Date    Abdominal distension (gaseous) 06/21/2022    Bloating    Abdominal distension (gaseous) 06/21/2022    Bloating    Candidiasis, unspecified 02/24/2014    Yeast infection    Encounter for general adult medical examination without abnormal findings 04/15/2013    Normal routine physical examination    Encounter for gynecological examination (general) (routine) without abnormal findings 06/16/2022    Well woman exam with routine gynecological exam    Encounter for insertion of intrauterine contraceptive device 04/14/2020    Encounter for IUD insertion    Encounter for removal of intrauterine contraceptive device 04/14/2020    Encounter for IUD removal    Encounter for screening for malignant neoplasm of cervix 02/23/2021    Cervical cancer screening    Other conditions influencing health status     Nulliparity    Other muscle spasm 11/11/2014    Trapezius muscle spasm    Other specified cough 12/17/2018    Upper airway cough syndrome    Other specified disorders of eustachian tube, bilateral 10/07/2015    Dysfunction of both eustachian tubes    Other specified health status 06/27/2016    Birth control    Personal history of diseases of the skin and subcutaneous tissue 04/15/2013    History of sebaceous cyst    Personal history of diseases of the skin and subcutaneous tissue     History of acne    Personal history of diseases of the skin and subcutaneous tissue     History of pityriasis rosea    Personal history of other diseases of the musculoskeletal system and connective tissue 11/11/2015    History of neck pain    Personal history of other diseases of the nervous system and sense organs 11/11/2014    History of tension  headache    Personal history of other diseases of the nervous system and sense organs 12/21/2013    History of ear pain    Personal history of other diseases of the respiratory system 11/06/2016    History of bronchitis    Personal history of other medical treatment 02/23/2021    History of screening mammography    Personal history of other specified conditions 02/13/2021    History of dysuria    Personal history of other specified conditions 03/10/2021    History of epigastric pain       Medication Reviewed - yes  Prior to Admission medications    Medication Sig Start Date End Date Taking? Authorizing Provider   albuterol (ProAir HFA) 90 mcg/actuation inhaler Inhale 2 puffs every 4 hours if needed for wheezing or shortness of breath. 5/2/24 5/2/25 Yes Tatiana Shahid MD   Arazlo 0.045 % lotion  3/6/23  Yes Historical Provider, MD   minoxidil (Loniten) 2.5 mg tablet Take 1 tablet (2.5 mg) by mouth once daily. 7/8/23  Yes Historical Provider, MD   spironolactone (Aldactone) 100 mg tablet Take 1 tablet (100 mg) by mouth once daily.   Yes Historical Provider, MD   triamcinolone (Kenalog) 0.1 % oral paste Apply to oral lesions 3 times daily as directed 9/24/24  Yes Viviane Ocasio MD   valACYclovir (Valtrex) 1 gram tablet Take 1 tablet (1,000 mg) by mouth 2 times a day for 7 days. Take 2 tabs (2000 mg) 2 times a days for 1 day. 9/24/24 10/1/24 Yes Viviane Ocasio MD       Physical Exam  Vitals:    09/25/24 0818   BP: 116/75   Pulse: 90   Resp: 13   Temp: 36.7 °C (98.1 °F)   SpO2: 100%      Weight   Vitals:    09/25/24 0818   Weight: 77.1 kg (170 lb)     BMI Body mass index is 27.44 kg/m².    General: A&Ox3, NAD.  HEENT: AT/NC.   CV: RRR. No murmur.  Resp: CTA bilaterally. No wheezing, rhonchi or rales.   GI: Soft, NT/ND.   Extrem: No edema. Pulses intact.  Skin: No Jaundice.   Neuro: No focal deficits.   Psych: Normal mood and affect.        Oropharyngeal Classification II (hard and soft palate, upper  portion of tonsils and uvula visible)  ASA PS Classification 1  Sedation Plan: Deep Sedation.  Procedure Plan - pre-procedural (re)assesment completed by physician:  discharge/transfer patient when discharge criteria met    Parth Stone DO  9/25/2024 8:34 AM

## 2024-09-25 NOTE — ANESTHESIA PREPROCEDURE EVALUATION
Patient: Toya De La Fuente    Procedure Information       Date/Time: 09/25/24 0850    Scheduled providers: Parth Stone DO    Procedure: COLONOSCOPY    Location: [facility]            Relevant Problems   Anesthesia (within normal limits)      Cardiac (within normal limits)      Pulmonary (within normal limits)      Neuro (within normal limits)      GI (within normal limits)      /Renal (within normal limits)      Liver (within normal limits)      Endocrine (within normal limits)      Hematology (within normal limits)      ID   (+) Cold sore   (+) Onychomycosis of great toe       Clinical information reviewed:   Tobacco  Allergies  Meds   Med Hx  Surg Hx  OB Status  Fam Hx  Soc   Hx        NPO Detail:  NPO/Void Status  Date of Last Liquid: 09/24/24  Time of Last Liquid: 2230  Date of Last Solid: 09/23/24  Last Intake Type: Clear fluids         Physical Exam    Airway  Mallampati: II  TM distance: >3 FB  Neck ROM: full     Cardiovascular   Rhythm: regular     Dental    Pulmonary   Breath sounds clear to auscultation     Abdominal        Hcg neg    Anesthesia Plan    History of general anesthesia?: yes  History of complications of general anesthesia?: no    ASA 1     MAC     The patient is not a current smoker.    intravenous induction   Anesthetic plan and risks discussed with patient.

## 2024-09-25 NOTE — DISCHARGE INSTRUCTIONS

## 2024-09-25 NOTE — ANESTHESIA POSTPROCEDURE EVALUATION
Patient: Toya De La Fuente    Procedure Summary       Date: 09/25/24 Room / Location: [facility]    Anesthesia Start: 0840 Anesthesia Stop: 0901    Procedure: COLONOSCOPY Diagnosis: Colon cancer screening    Scheduled Providers: Parth Stone DO Responsible Provider: ALVAREZ Macdonald    Anesthesia Type: MAC ASA Status: 1            Anesthesia Type: MAC    Vitals Value Taken Time   /75 09/25/24 0920   Temp 36 °C (96.8 °F) 09/25/24 0900   Pulse 81 09/25/24 0920   Resp 17 09/25/24 0920   SpO2 100 % 09/25/24 0920       Anesthesia Post Evaluation    Patient location during evaluation: bedside  Patient participation: complete - patient participated  Level of consciousness: awake and alert  Pain score: 0  Pain management: adequate  Airway patency: patent  Cardiovascular status: acceptable  Respiratory status: acceptable  Hydration status: acceptable  Postoperative Nausea and Vomiting: none        No notable events documented.

## 2024-09-27 PROBLEM — S73.199A LABRAL TEAR OF HIP JOINT: Status: RESOLVED | Noted: 2023-08-30 | Resolved: 2024-09-27

## 2024-09-27 PROBLEM — K59.00 CONSTIPATION: Status: RESOLVED | Noted: 2024-08-09 | Resolved: 2024-09-27

## 2024-09-27 PROBLEM — M25.852 FEMOROACETABULAR IMPINGEMENT OF LEFT HIP: Status: RESOLVED | Noted: 2023-08-30 | Resolved: 2024-09-27

## 2024-09-27 PROBLEM — R10.13 DYSPEPSIA: Status: RESOLVED | Noted: 2023-08-30 | Resolved: 2024-09-27

## 2024-09-27 PROBLEM — R51.9 FACIAL PAIN: Status: RESOLVED | Noted: 2024-07-17 | Resolved: 2024-09-27

## 2024-10-04 LAB
LABORATORY COMMENT REPORT: NORMAL
PATH REPORT.COMMENTS IMP SPEC: NORMAL
PATH REPORT.FINAL DX SPEC: NORMAL
PATH REPORT.GROSS SPEC: NORMAL
PATH REPORT.RELEVANT HX SPEC: NORMAL
PATH REPORT.TOTAL CANCER: NORMAL

## 2024-11-01 ENCOUNTER — APPOINTMENT (OUTPATIENT)
Dept: PODIATRY | Facility: CLINIC | Age: 44
End: 2024-11-01
Payer: COMMERCIAL

## 2024-11-01 DIAGNOSIS — M20.11 HAV (HALLUX ABDUCTO VALGUS), RIGHT: ICD-10-CM

## 2024-11-01 DIAGNOSIS — M20.12 HAV (HALLUX ABDUCTO VALGUS), LEFT: ICD-10-CM

## 2024-11-01 DIAGNOSIS — B35.1 NAIL FUNGAL INFECTION: Primary | ICD-10-CM

## 2024-11-01 PROCEDURE — 99212 OFFICE O/P EST SF 10 MIN: CPT | Performed by: PODIATRIST

## 2024-11-01 PROCEDURE — 1036F TOBACCO NON-USER: CPT | Performed by: PODIATRIST

## 2024-11-27 DIAGNOSIS — B37.31 CANDIDAL VAGINITIS: Primary | ICD-10-CM

## 2024-11-27 RX ORDER — FLUCONAZOLE 150 MG/1
150 TABLET ORAL ONCE
Qty: 1 TABLET | Refills: 0 | Status: SHIPPED | OUTPATIENT
Start: 2024-11-27 | End: 2024-11-27

## 2025-05-07 ENCOUNTER — APPOINTMENT (OUTPATIENT)
Dept: PRIMARY CARE | Facility: CLINIC | Age: 45
End: 2025-05-07
Payer: COMMERCIAL

## 2025-05-08 ENCOUNTER — OFFICE VISIT (OUTPATIENT)
Dept: URGENT CARE | Age: 45
End: 2025-05-08
Payer: COMMERCIAL

## 2025-05-08 VITALS
TEMPERATURE: 97.9 F | DIASTOLIC BLOOD PRESSURE: 77 MMHG | BODY MASS INDEX: 27.8 KG/M2 | SYSTOLIC BLOOD PRESSURE: 119 MMHG | OXYGEN SATURATION: 98 % | RESPIRATION RATE: 16 BRPM | WEIGHT: 173 LBS | HEART RATE: 92 BPM | HEIGHT: 66 IN

## 2025-05-08 DIAGNOSIS — R30.0 DYSURIA: ICD-10-CM

## 2025-05-08 DIAGNOSIS — N39.0 URINARY TRACT INFECTION WITHOUT HEMATURIA, SITE UNSPECIFIED: Primary | ICD-10-CM

## 2025-05-08 DIAGNOSIS — N39.9 URINARY PROBLEM IN FEMALE: ICD-10-CM

## 2025-05-08 LAB
POC APPEARANCE, URINE: ABNORMAL
POC BILIRUBIN, URINE: NEGATIVE
POC BLOOD, URINE: ABNORMAL
POC COLOR, URINE: YELLOW
POC GLUCOSE, URINE: NEGATIVE MG/DL
POC KETONES, URINE: NEGATIVE MG/DL
POC LEUKOCYTES, URINE: ABNORMAL
POC NITRITE,URINE: NEGATIVE
POC PH, URINE: 6 PH
POC PROTEIN, URINE: ABNORMAL MG/DL
POC SPECIFIC GRAVITY, URINE: 1.01
POC UROBILINOGEN, URINE: 0.2 EU/DL
PREGNANCY TEST URINE, POC: NEGATIVE

## 2025-05-08 RX ORDER — NITROFURANTOIN 25; 75 MG/1; MG/1
100 CAPSULE ORAL 2 TIMES DAILY
Qty: 14 CAPSULE | Refills: 0 | Status: SHIPPED | OUTPATIENT
Start: 2025-05-08 | End: 2025-05-15

## 2025-05-08 RX ORDER — NITROFURANTOIN 25; 75 MG/1; MG/1
100 CAPSULE ORAL 2 TIMES DAILY
Qty: 14 CAPSULE | Refills: 0 | Status: SHIPPED | OUTPATIENT
Start: 2025-05-08 | End: 2025-05-08

## 2025-05-08 NOTE — PROGRESS NOTES
"Subjective   Patient ID: Toya De La Fuente is a 44 y.o. female. They present today with a chief complaint of Urinary Problem.    History of Present Illness  44-year-old female presents with 4 days of acute onset of dysuria urinary frequency and urgency.  Maybe a bit cloudy urine as well.  Mild nausea, no vomiting, or diarrhea.  No fever chills, stomach or back pain.  She is at the last day of her menses ( that have been a bit more irregular as she gets closer to menopause).  No new sexual partners, no new medicines, ?maybe her partner used a new lubricant on Sunday but otherwise everything else is the same.  No known history of previous UTIs.          Past Medical History  Allergies as of 05/08/2025    (No Known Allergies)       Prescriptions Prior to Admission[1]     Medical History[2]    Surgical History[3]     reports that she has never smoked. She has never used smokeless tobacco. She reports that she does not drink alcohol and does not use drugs.    Review of Systems  Review of Systems                               Objective    Vitals:    05/08/25 1402   BP: 119/77   BP Location: Right arm   Patient Position: Sitting   BP Cuff Size: Adult   Pulse: 92   Resp: 16   Temp: 36.6 °C (97.9 °F)   TempSrc: Oral   SpO2: 98%   Weight: 78.5 kg (173 lb)   Height: 1.676 m (5' 6\")     Patient's last menstrual period was 05/04/2025 (exact date).    Physical Exam    General- No apparent distress, well appearing  Cardiovascular- regular rate and rhythm, no gallops, murmurs or rubs  Lungs- clear to auscultation bilaterally, no wheezing or rhonchi  Abdomen-soft, nontender, nondistended, no flank pain    Procedures    Point of Care Test & Imaging Results from this visit  No results found for this visit on 05/08/25.   Imaging  No results found.    Cardiology, Vascular, and Other Imaging  No other imaging results found for the past 2 days      Diagnostic study results (if any) were reviewed by Frank Clarke MD.    Assessment/Plan "   Allergies, medications, history, and pertinent labs/EKGs/Imaging reviewed by Frank Clarke MD.     Medical Decision Making  1.-Urinary symptoms-suspect first UTI.  Start Macrobid, check culture follow-up with PCP    Orders and Diagnoses  Diagnoses and all orders for this visit:  Dysuria  -     POCT pregnancy, urine manually resulted  Urinary problem in female  -     POCT UA Automated manually resulted      Medical Admin Record      Patient disposition: Home    Electronically signed by Frank Clarke MD  2:15 PM           [1] (Not in a hospital admission)  [2]   Past Medical History:  Diagnosis Date    Abdominal distension (gaseous) 06/21/2022    Bloating    Abdominal distension (gaseous) 06/21/2022    Bloating    Candidiasis, unspecified 02/24/2014    Yeast infection    Encounter for general adult medical examination without abnormal findings 04/15/2013    Normal routine physical examination    Encounter for gynecological examination (general) (routine) without abnormal findings 06/16/2022    Well woman exam with routine gynecological exam    Encounter for insertion of intrauterine contraceptive device 04/14/2020    Encounter for IUD insertion    Encounter for removal of intrauterine contraceptive device 04/14/2020    Encounter for IUD removal    Encounter for screening for malignant neoplasm of cervix 02/23/2021    Cervical cancer screening    Other conditions influencing health status     Nulliparity    Other muscle spasm 11/11/2014    Trapezius muscle spasm    Other specified cough 12/17/2018    Upper airway cough syndrome    Other specified disorders of eustachian tube, bilateral 10/07/2015    Dysfunction of both eustachian tubes    Other specified health status 06/27/2016    Birth control    Personal history of diseases of the skin and subcutaneous tissue 04/15/2013    History of sebaceous cyst    Personal history of diseases of the skin and subcutaneous tissue     History of acne    Personal history of  diseases of the skin and subcutaneous tissue     History of pityriasis rosea    Personal history of other diseases of the musculoskeletal system and connective tissue 11/11/2015    History of neck pain    Personal history of other diseases of the nervous system and sense organs 11/11/2014    History of tension headache    Personal history of other diseases of the nervous system and sense organs 12/21/2013    History of ear pain    Personal history of other diseases of the respiratory system 11/06/2016    History of bronchitis    Personal history of other medical treatment 02/23/2021    History of screening mammography    Personal history of other specified conditions 02/13/2021    History of dysuria    Personal history of other specified conditions 03/10/2021    History of epigastric pain   [3]   Past Surgical History:  Procedure Laterality Date    COLONOSCOPY  06/01/2021    Complete Colonoscopy    OTHER SURGICAL HISTORY  04/30/2020    Abdominal liposuction    SEPTOPLASTY  11/13/2017    Septoplasty

## 2025-05-10 LAB — BACTERIA UR CULT: ABNORMAL

## 2025-06-12 ENCOUNTER — APPOINTMENT (OUTPATIENT)
Dept: PRIMARY CARE | Facility: CLINIC | Age: 45
End: 2025-06-12
Payer: COMMERCIAL

## 2025-07-16 ASSESSMENT — PROMIS GLOBAL HEALTH SCALE
RATE_GENERAL_HEALTH: GOOD
RATE_MENTAL_HEALTH: VERY GOOD
RATE_PHYSICAL_HEALTH: FAIR
CARRYOUT_SOCIAL_ACTIVITIES: VERY GOOD
RATE_QUALITY_OF_LIFE: VERY GOOD
RATE_AVERAGE_PAIN: 2
CARRYOUT_PHYSICAL_ACTIVITIES: COMPLETELY
RATE_SOCIAL_SATISFACTION: EXCELLENT
EMOTIONAL_PROBLEMS: SOMETIMES
RATE_AVERAGE_FATIGUE: MILD

## 2025-07-17 ENCOUNTER — APPOINTMENT (OUTPATIENT)
Dept: PRIMARY CARE | Facility: CLINIC | Age: 45
End: 2025-07-17
Payer: COMMERCIAL

## 2025-07-17 VITALS
BODY MASS INDEX: 26.42 KG/M2 | HEIGHT: 66 IN | HEART RATE: 88 BPM | OXYGEN SATURATION: 98 % | SYSTOLIC BLOOD PRESSURE: 110 MMHG | DIASTOLIC BLOOD PRESSURE: 76 MMHG | WEIGHT: 164.4 LBS

## 2025-07-17 DIAGNOSIS — Z00.00 ANNUAL PHYSICAL EXAM: Primary | ICD-10-CM

## 2025-07-17 PROCEDURE — 99396 PREV VISIT EST AGE 40-64: CPT | Performed by: INTERNAL MEDICINE

## 2025-07-17 PROCEDURE — 1036F TOBACCO NON-USER: CPT | Performed by: INTERNAL MEDICINE

## 2025-07-17 PROCEDURE — 3008F BODY MASS INDEX DOCD: CPT | Performed by: INTERNAL MEDICINE

## 2025-07-17 NOTE — PROGRESS NOTES
Subjective   Patient ID: Toya De La Fuente is a 44 y.o. female who presents for Annual Exam.  HPI    Patient is here for annual exam  Need suggestions for weight loss.  sees dentist  regularly.    Consumes healthy diet    does regular exercise  pregnancy historyg0  No bladder symptoms  Cervical cancer screen scheduled  No history of abnormal Pap  Mammogram due in fall  Colonoscopy done 2024  Medical conditions:stable  Vaccines:uptodate    Review of Systems  General: No significant change in weight, no fatigue, no fever, chills, or night sweats.  HEENT: No headache, dizziness, syncope. No blurred vision, double vision. No hearing loss, or ringing. No nasal discharge, sinus problems. No loose teeth, sore throat, hoarseness or neck stiffness.   Breast: No pain or discharge. No mass felt.   Respiratory: No cough, mucous in throat, hemoptysis, wheezing, or shortness of breath. No snoring.  Cardiac: No chest pain, palpitations, orthopnea. No leg swelling or claudication pain.   Gastrointestinal: No indigestion, heartburn, nausea, vomiting, diarrhea, constipation, rectal bleeding or hemorrhoids.  Genitourinary: No UTI symptoms, stones, incontinence.  OBGYN: No abnormal bleeding, No pelvic pain or bloating.  Endocrine: No excess thirst or urination.  Hematopoietic: No anemia, easy bruising or bleeding. No history of blood transfusion.  Neuro: No localized weakness, numbness or tingling. No tremor, history of seizure. No history of memory problems.  Psychological: No anxiety, depression or sleep disturbance.    HISTORY  Social History     Socioeconomic History    Marital status:      Spouse name: Not on file    Number of children: Not on file    Years of education: Not on file    Highest education level: Not on file   Occupational History    Not on file   Tobacco Use    Smoking status: Never    Smokeless tobacco: Never   Vaping Use    Vaping status: Never Used   Substance and Sexual Activity    Alcohol use: Yes      "Alcohol/week: 2.0 standard drinks of alcohol     Types: 2 Glasses of wine per week     Comment: monthly    Drug use: Never    Sexual activity: Yes     Partners: Male     Birth control/protection: Male Sterilization   Other Topics Concern    Not on file   Social History Narrative    He is originally from the [place] area did travel around [place] in the [place] for a while has been back in [place] for about 5 years currently resides in [facility]    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    She is  to her  of 26 years    They do not have children    Her diet overall she feels is pretty healthy though travel makes it difficult    Exercise 4 days     She is not a smoker    Occasional alcohol  2-3 times monthly      Social Drivers of Health     Financial Resource Strain: Not on file   Food Insecurity: Not on file   Transportation Needs: Not on file   Physical Activity: Not on file   Stress: Not on file   Social Connections: Not on file   Intimate Partner Violence: Not on file   Housing Stability: Not on file     Family History[1]  Medical History[2]  Surgical History[3]  @VACCINES  Objective   /76   Pulse 88   Ht 1.676 m (5' 6\")   Wt 74.6 kg (164 lb 6.4 oz)   SpO2 98%   BMI 26.53 kg/m²      Lab Results   Component Value Date    WBC 3.4 (L) 09/24/2024    HGB 14.6 09/24/2024    HCT 43.3 09/24/2024    MCV 94 09/24/2024     09/24/2024   PAP@MAMMOGRAM  Physical Exam  Constitutional: Alert and in no acute distress. Well developed, well nourished.   Eyes: Normal external exam. Pupils were equal in size, round, reactive to light (PERRL) with normal accommodation and extraocular movements intact (EOMI).   Ears, Nose, Mouth, and Throat: Otoscopic examination: Normal.  Hearing: Normal.    Neck: No neck mass was observed. Supple. Thyroid not enlarged and there were no palpable thyroid nodules.   Cardiovascular: Heart rate and rhythm were " normal, normal S1 and S2, no gallops, no murmurs and no pericardial rub. Pedal pulses: Normal. No peripheral edema.   Pulmonary: No respiratory distress. Clear bilateral breath sounds.   Abdomen: Soft nontender; no abdominal mass palpated. No organomegaly.   Genitourinary: sees gyn  Musculoskeletal: Gait and station: Normal.  Range of motion: Normal.  Muscle strength/tone: Normal.    Skin: No rash  Psychiatric: Judgment and insight: Intact. Mood and affect: Normal.   Lymphatic: No cervical lymphadenopathy.       Assessment/Plan   Problem List Items Addressed This Visit       Annual physical exam - Primary     Preventive  exam and counseling completed  She is up-to-date with GYN care and mammogram  She has executive  physical coming up in a couple of months.  Will get lab work and mammogram order then.  Lifestyle changes helped her lose weight.  Continue same  Follow-up in 1 year and as needed.            [1]   Family History  Problem Relation Name Age of Onset    Breast cancer Mother  53    Hypertension Mother      Hyperlipidemia Mother      Heart attack Father  45    Diabetes type II Father      Other (ESRD) Father          Dialysis    Other (Stroke Syndrome) Father      Breast cancer Sister  46        reurrence at 53  doing ok    Breast cancer Sister  46    No Known Problems Sister      No Known Problems Sister      Pancreatitis Brother  24        Acute    Other (Stroke Syndrome) Brother      Colon cancer Maternal Grandmother      Ovarian cancer Maternal Grandmother      Pancreatic cancer Maternal Grandmother      Breast cancer Paternal Grandmother      Breast cancer Other Maternal Aunt     Breast cancer Other Paternal Aunt     Heart disease Brother  30 - 39   [2]   Past Medical History:  Diagnosis Date    Abdominal distension (gaseous) 06/21/2022    Bloating    Abdominal distension (gaseous) 06/21/2022    Bloating    Candidiasis, unspecified 02/24/2014    Yeast infection    Encounter for general adult medical  examination without abnormal findings 04/15/2013    Normal routine physical examination    Encounter for gynecological examination (general) (routine) without abnormal findings 06/16/2022    Well woman exam with routine gynecological exam    Encounter for insertion of intrauterine contraceptive device 04/14/2020    Encounter for IUD insertion    Encounter for removal of intrauterine contraceptive device 04/14/2020    Encounter for IUD removal    Encounter for screening for malignant neoplasm of cervix 02/23/2021    Cervical cancer screening    Other conditions influencing health status     Nulliparity    Other muscle spasm 11/11/2014    Trapezius muscle spasm    Other specified cough 12/17/2018    Upper airway cough syndrome    Other specified disorders of eustachian tube, bilateral 10/07/2015    Dysfunction of both eustachian tubes    Other specified health status 06/27/2016    Birth control    Personal history of diseases of the skin and subcutaneous tissue 04/15/2013    History of sebaceous cyst    Personal history of diseases of the skin and subcutaneous tissue     History of acne    Personal history of diseases of the skin and subcutaneous tissue     History of pityriasis rosea    Personal history of other diseases of the musculoskeletal system and connective tissue 11/11/2015    History of neck pain    Personal history of other diseases of the nervous system and sense organs 11/11/2014    History of tension headache    Personal history of other diseases of the nervous system and sense organs 12/21/2013    History of ear pain    Personal history of other diseases of the respiratory system 11/06/2016    History of bronchitis    Personal history of other medical treatment 02/23/2021    History of screening mammography    Personal history of other specified conditions 02/13/2021    History of dysuria    Personal history of other specified conditions 03/10/2021    History of epigastric pain   [3]   Past Surgical  History:  Procedure Laterality Date    COLONOSCOPY  06/01/2021    Complete Colonoscopy    OTHER SURGICAL HISTORY  04/30/2020    Abdominal liposuction    SEPTOPLASTY  11/13/2017    Septoplasty    SINUS SURGERY  11/20/2024

## 2025-07-29 ENCOUNTER — APPOINTMENT (OUTPATIENT)
Dept: OBSTETRICS AND GYNECOLOGY | Facility: CLINIC | Age: 45
End: 2025-07-29
Payer: COMMERCIAL